# Patient Record
Sex: FEMALE | Race: BLACK OR AFRICAN AMERICAN | NOT HISPANIC OR LATINO | Employment: FULL TIME | URBAN - METROPOLITAN AREA
[De-identification: names, ages, dates, MRNs, and addresses within clinical notes are randomized per-mention and may not be internally consistent; named-entity substitution may affect disease eponyms.]

---

## 2019-03-25 ENCOUNTER — HOSPITAL ENCOUNTER (EMERGENCY)
Facility: HOSPITAL | Age: 34
Discharge: HOME | End: 2019-03-25
Attending: EMERGENCY MEDICINE
Payer: MEDICAID

## 2019-03-25 VITALS
HEART RATE: 70 BPM | WEIGHT: 200 LBS | RESPIRATION RATE: 16 BRPM | BODY MASS INDEX: 33.32 KG/M2 | OXYGEN SATURATION: 100 % | SYSTOLIC BLOOD PRESSURE: 122 MMHG | TEMPERATURE: 98 F | DIASTOLIC BLOOD PRESSURE: 76 MMHG | HEIGHT: 65 IN

## 2019-03-25 DIAGNOSIS — R10.84 GENERALIZED ABDOMINAL PAIN: ICD-10-CM

## 2019-03-25 DIAGNOSIS — L29.9 ITCHING: ICD-10-CM

## 2019-03-25 DIAGNOSIS — R11.2 NON-INTRACTABLE VOMITING WITH NAUSEA, UNSPECIFIED VOMITING TYPE: Primary | ICD-10-CM

## 2019-03-25 LAB
B-HCG UR QL: NEGATIVE
BUN SERPL-MCNC: 10 MG/DL (ref 6–30)
CHLORIDE SERPL-SCNC: 104 MMOL/L (ref 95–110)
CREAT SERPL-MCNC: 0.7 MG/DL (ref 0.5–1.4)
CTP QC/QA: YES
GLUCOSE SERPL-MCNC: 90 MG/DL (ref 70–110)
HCT VFR BLD CALC: 33 %PCV (ref 36–54)
POC IONIZED CALCIUM: 1.14 MMOL/L (ref 1.06–1.42)
POC TCO2 (MEASURED): 25 MMOL/L (ref 23–29)
POTASSIUM BLD-SCNC: 3.9 MMOL/L (ref 3.5–5.1)
SAMPLE: ABNORMAL
SODIUM BLD-SCNC: 142 MMOL/L (ref 136–145)

## 2019-03-25 PROCEDURE — 96361 HYDRATE IV INFUSION ADD-ON: CPT

## 2019-03-25 PROCEDURE — 99284 PR EMERGENCY DEPT VISIT,LEVEL IV: ICD-10-PCS | Mod: ,,, | Performed by: EMERGENCY MEDICINE

## 2019-03-25 PROCEDURE — 63600175 PHARM REV CODE 636 W HCPCS: Performed by: EMERGENCY MEDICINE

## 2019-03-25 PROCEDURE — 25000003 PHARM REV CODE 250: Performed by: EMERGENCY MEDICINE

## 2019-03-25 PROCEDURE — 99284 EMERGENCY DEPT VISIT MOD MDM: CPT | Mod: ,,, | Performed by: EMERGENCY MEDICINE

## 2019-03-25 PROCEDURE — 81025 URINE PREGNANCY TEST: CPT | Performed by: EMERGENCY MEDICINE

## 2019-03-25 PROCEDURE — 99284 EMERGENCY DEPT VISIT MOD MDM: CPT | Mod: 25

## 2019-03-25 PROCEDURE — 96374 THER/PROPH/DIAG INJ IV PUSH: CPT

## 2019-03-25 RX ORDER — ONDANSETRON 2 MG/ML
4 INJECTION INTRAMUSCULAR; INTRAVENOUS
Status: COMPLETED | OUTPATIENT
Start: 2019-03-25 | End: 2019-03-25

## 2019-03-25 RX ORDER — ONDANSETRON 2 MG/ML
INJECTION INTRAMUSCULAR; INTRAVENOUS
Status: DISCONTINUED
Start: 2019-03-25 | End: 2019-03-25 | Stop reason: HOSPADM

## 2019-03-25 RX ORDER — ONDANSETRON 4 MG/1
4 TABLET, ORALLY DISINTEGRATING ORAL EVERY 6 HOURS PRN
Qty: 12 TABLET | Refills: 0 | OUTPATIENT
Start: 2019-03-25 | End: 2021-07-23

## 2019-03-25 RX ADMIN — ALUMINUM HYDROXIDE, MAGNESIUM HYDROXIDE, AND SIMETHICONE 50 ML: 200; 200; 20 SUSPENSION ORAL at 01:03

## 2019-03-25 RX ADMIN — SODIUM CHLORIDE, SODIUM LACTATE, POTASSIUM CHLORIDE, AND CALCIUM CHLORIDE 1000 ML: .6; .31; .03; .02 INJECTION, SOLUTION INTRAVENOUS at 12:03

## 2019-03-25 RX ADMIN — ONDANSETRON 4 MG: 2 INJECTION INTRAMUSCULAR; INTRAVENOUS at 12:03

## 2019-03-25 NOTE — DISCHARGE INSTRUCTIONS
Our goal in the emergency department is to always give you outstanding care and exceptional service. You may receive a survey by mail or e-mail in the next week regarding your experience in our ED. We would greatly appreciate your completing and returning the survey. Your feedback provides us with a way to recognize our staff who give very good care and it helps us learn how to improve when your experience was below our aspiration of excellence.     Take Benadryl as needed for itching.  Use the Zofran as needed for nausea.  He may use Maalox as needed for abdominal pain. Continue to take your Protonix as directed.

## 2019-03-25 NOTE — ED PROVIDER NOTES
"Encounter Date: 3/25/2019    SCRIBE #1 NOTE: I, Humphrey Hoffman, am scribing for, and in the presence of,  Nataliia Beltran MD. I have scribed the following portions of the note - Other sections scribed: HPI, ROS, PE.       History     Chief Complaint   Patient presents with    Abdominal Pain     ate [employer] on friday had mold on bread, nvd     33 year year old female with PMHx of GERD and bronchitis presents to the ED with complaints of abdominal pain. The patient states that she ate at [employer] a few days ago and noticed half way through her meal that the bread was covered in blue/black mold. She states that she immediately vomited because "I have a weak stomach," but later in the day vomited again as she became nauseas. That night when she laid down, she had gradual onset abdominal pain that became worse the next morning. She describes the abdominal pain as feeling like "knots in her stomach." She has had multiple episodes of vomiting each day, has been constantly nauseas, and states her abdominal pain is located all over her stomach. She was the only one who ate the moldy food. She is also complaining of a heavier period than usual with many clots, which is unusual for her. She also endorses generalized body itching, light-headedness, fatigue, and chills. She is a mother of 3. She denies diarrhea,     The history is provided by the patient.     Review of patient's allergies indicates:   Allergen Reactions    Pcn [penicillins] Hives and Swelling     Past Medical History:   Diagnosis Date    GERD (gastroesophageal reflux disease)     History of bronchitis     as a child     History of pre-eclampsia in prior pregnancy, currently pregnant     history with previous two     Hypertension     high bp off and on with this pregnancy    Rectal bleeding      Past Surgical History:   Procedure Laterality Date     SECTION      x2    DELIVERY-CEASAREAN SECTION Bilateral 2014    Performed by Renata" [facility] Al[facility]    TUBAL LIGATION       Family History   Problem Relation Age of Onset    Asthma Mother     Hypertension Mother     Arthritis Father     Diabetes Father     Hyperlipidemia Father     Hypertension Father     Cancer Maternal Grandfather     Diabetes Maternal Grandfather     Heart disease Maternal Grandfather     Cancer Paternal Grandmother     Diabetes Paternal Grandmother      Social History     Tobacco Use    Smoking status: Current Every Day Smoker     Packs/day: 0.20     Types: Cigars    Smokeless tobacco: Former User     Quit date: 10/30/2013   Substance Use Topics    Alcohol use: Yes     Alcohol/week: 0.0 oz    Drug use: No     Review of Systems   Constitutional: Positive for appetite change (less), chills and fatigue. Negative for fever.   HENT: Positive for sore throat (from vomiting). Negative for rhinorrhea.    Respiratory: Negative for cough and shortness of breath.    Cardiovascular: Negative for chest pain and leg swelling.   Gastrointestinal: Positive for abdominal pain, nausea and vomiting. Negative for blood in stool and diarrhea.   Genitourinary: Positive for menstrual problem (heavier than normal). Negative for dysuria, hematuria and urgency.   Musculoskeletal: Negative for arthralgias and myalgias.   Skin: Negative for pallor and rash.   Neurological: Positive for weakness and light-headedness. Negative for syncope.   Hematological: Negative for adenopathy. Does not bruise/bleed easily.       Physical Exam     Initial Vitals [03/25/19 0913]   BP Pulse Resp Temp SpO2   (!) 159/90 85 18 98.1 °F (36.7 °C) 100 %      MAP       --         Physical Exam    Nursing note and vitals reviewed.  Constitutional: She appears well-developed and well-nourished. No distress.   HENT:   Head: Normocephalic and atraumatic.   Right Ear: External ear normal.   Left Ear: External ear normal.   Nose: Nose normal.   Mouth/Throat: Oropharynx is clear and moist.   Eyes: EOM  are normal. Pupils are equal, round, and reactive to light.   Neck: Normal range of motion. Neck supple. No JVD present.   Cardiovascular: Normal rate, regular rhythm and normal heart sounds. Exam reveals no gallop and no friction rub.    No murmur heard.  Pulmonary/Chest: Breath sounds normal. No respiratory distress. She has no wheezes. She has no rhonchi. She has no rales.   Abdominal: Soft. She exhibits no distension.   Mild diffuse tenderness. No rebound or guarding.   Musculoskeletal: Normal range of motion. She exhibits no edema.   Neurological: She is alert and oriented to person, place, and time. She has normal strength. No cranial nerve deficit or sensory deficit.   Skin: Skin is warm and dry. No rash noted.   Psychiatric: Her behavior is normal. Thought content normal.         ED Course   Procedures  Labs Reviewed   ISTAT PROCEDURE - Abnormal; Notable for the following components:       Result Value    POC Hematocrit 33 (*)     All other components within normal limits   POCT URINE PREGNANCY          Imaging Results    None          Medical Decision Making:   History:   Old Medical Records: I decided to obtain old medical records.  Old Records Summarized: other records.       <> Summary of Records: History of GERD.  Has ED visits for unrelated complaints.  Initial Assessment:   33-year-old woman complains of nausea vomiting and abdominal pain for 3 days after eating a [employer] sandwich on which she found multi bread.  She reports her symptoms are only slightly improved today. she can now tolerate ice.  She is concerned about the possibility of an allergic reaction to penicillin given that she visualized mold on the bread.  I feel that this is unlikely given that this would have been a very small amount that would possibly have been destroyed by gastric juices right however she has no rash and mild itching.  I have recommended she use Benadryl p.r.n. itching.  She is driving home.  Will not give a dose  here.  I will give her Zofran and IV fluids and check an i-STAT Chem 8 here.  Will discharge with Zofran as well.  Her abdominal exam is benign.  I do not feel any imaging is needed today.  Clinical Tests:   Lab Tests: Ordered and Reviewed            Scribe Attestation:   Scribe #1: I performed the above scribed service and the documentation accurately describes the services I performed. I attest to the accuracy of the note.    Attending Attestation:           Physician Attestation for Scribe:      Comments: I, Dr. Nataliia Beltran, personally performed the services described in this documentation. All medical record entries made by the scribe were at my direction and in my presence.  I have reviewed the chart and agree that the record reflects my personal performance and is accurate and complete. Nataliia Beltran MD.      Attending ED Notes:   2:30 PM - Patient reports she is feeling somewhat better after IV fluids and GI cocktail. I suspect that her abdominal pain is somewhat due to aggravation of her GERD. Patient advised me at discharge that she is no longer taking her Protonix. I recommended she start taking Zantac 150 mg QHS and that she should follow up with her PCP in 2 weeks for recheck. She was also given a prescription for Zofran PRN nausea and instructed on advancing diet slowly.              Clinical Impression:       ICD-10-CM ICD-9-CM   1. Non-intractable vomiting with nausea, unspecified vomiting type R11.2 787.01   2. Itching L29.9 698.9   3. Generalized abdominal pain R10.84 789.07                                Nataliia Beltran MD  03/28/19 2209

## 2019-03-25 NOTE — ED NOTES
"Chief Complaint   Patient presents with    Abdominal Pain     ate [employer] on friday had mold on bread, nvd     Pt presents to ED for NVD following eating a "moldy fish filet on Friday." Pt states she noticed the mold on the sandwich after eating half of the sandwich. Pt endorses "itching all over. I'm allergic to penicillin and I know there is penicillin in mold." Pt denies SOB/CP.    Adult physical assessment:    Neuro: Patient AAOx4.   HEENT: PERRLA, no purulent drainage to nares, throat, eyes.  Cardiac: No abnormalities noted. No C/O CP at this time.  Respiratory: Airway is open and patent. No C/O SOB at this time.  Gastro: Bowel sounds present and normoactive x4 quadrants. Abdomen is soft, non-tender, and non-distended.  Musculoskeletal: Patient moving all extremities spontaneously. No obvious swelling or deformity visible.    Active Ambulatory Problems     Diagnosis Date Noted    GERD (gastroesophageal reflux disease) 2013    History of pre-eclampsia in prior pregnancy, currently pregnant 2013    H/O  section 2013    Trichomonas - treated with negative OLGA 2013    Dehydration 2013    Supervision of other normal pregnancy 2013    Abdominal pain in pregnancy 2014    Pregnancy with one fetus 2014    wants btl 2014     Resolved Ambulatory Problems     Diagnosis Date Noted    No Resolved Ambulatory Problems     Past Medical History:   Diagnosis Date    GERD (gastroesophageal reflux disease)     History of bronchitis     History of pre-eclampsia in prior pregnancy, currently pregnant     Hypertension     Rectal bleeding      Review of patient's allergies indicates:   Allergen Reactions    Pcn [penicillins] Hives and Swelling       "

## 2019-09-12 ENCOUNTER — OFFICE VISIT (OUTPATIENT)
Dept: URGENT CARE | Facility: CLINIC | Age: 34
End: 2019-09-12
Payer: MEDICAID

## 2019-09-12 VITALS
TEMPERATURE: 99 F | WEIGHT: 220 LBS | BODY MASS INDEX: 37.18 KG/M2 | SYSTOLIC BLOOD PRESSURE: 123 MMHG | HEART RATE: 85 BPM | OXYGEN SATURATION: 98 % | DIASTOLIC BLOOD PRESSURE: 82 MMHG

## 2019-09-12 DIAGNOSIS — S53.401A SPRAIN OF RIGHT ELBOW, INITIAL ENCOUNTER: Primary | ICD-10-CM

## 2019-09-12 PROCEDURE — 99214 PR OFFICE/OUTPT VISIT, EST, LEVL IV, 30-39 MIN: ICD-10-PCS | Mod: S$GLB,,, | Performed by: NURSE PRACTITIONER

## 2019-09-12 PROCEDURE — 73080 XR ELBOW COMPLETE 3 VIEW RIGHT: ICD-10-PCS | Mod: RT,S$GLB,, | Performed by: RADIOLOGY

## 2019-09-12 PROCEDURE — 99214 OFFICE O/P EST MOD 30 MIN: CPT | Mod: S$GLB,,, | Performed by: NURSE PRACTITIONER

## 2019-09-12 PROCEDURE — 73080 X-RAY EXAM OF ELBOW: CPT | Mod: RT,S$GLB,, | Performed by: RADIOLOGY

## 2019-09-12 RX ORDER — CYCLOBENZAPRINE HCL 10 MG
10 TABLET ORAL 3 TIMES DAILY PRN
COMMUNITY

## 2019-09-12 RX ORDER — TRAMADOL HYDROCHLORIDE 50 MG/1
50 TABLET ORAL EVERY 6 HOURS
COMMUNITY

## 2019-09-12 NOTE — PATIENT INSTRUCTIONS
May take Ibuprofen or Tylenol as needed for pain.     If you were prescribed a narcotic or controlled medication, do not drive or operate heavy equipment or machinery while taking these medications.  You must understand that you've received an Urgent Care treatment only and that you may be released before all your medical problems are known or treated. You, the patient, will arrange for follow up care as instructed.  Follow up with your PCP or specialty clinic as directed within 2-5 days if not improved or as needed.  You can call [phone] to schedule an appointment with the appropriate provider.  If your condition worsens we recommend that you receive another evaluation at the emergency room immediately or contact your primary medical clinics after hours call service to discuss your concerns.  Please return here or go to the Emergency Department for any concerns or worsening of condition.      Elbow Sprain    A sprain is a tearing of the ligaments that hold a joint together. This may take up to 6 weeks to fully heal, depending on how severe it is. Moderate to severe sprains are treated with a sling or splint. Minor sprains can be treated without any special support.  Home care  The following guidelines will help you care for your injury at home:  · Keep your arm elevated to reduce pain and swelling. When sitting or lying down keep your arm above the level of your heart. You can do this by placing your arm on a pillow that rests on your chest or on a pillow at your side. This is most important during the first 2 days (48 hours) after injury.  · Put an ice pack on the injured area. Do this for 20 minutes every 1 to 2 hours the first day. You can make an ice pack by wrapping a plastic bag of ice cubes in a thin towel. As the ice melts, be careful that the splint doesnt get wet. Continue using the ice pack 3 to 4 times a day for the next 2 days. Then use the ice pack as needed to ease pain and swelling.  · If  you were given a plaster or fiberglass splint, leave it on as advised, or until you see your healthcare provider. Keep it dry at all times. Bathe with your splint out of the water. Protect it with a large plastic bag, rubber-banded at the top end. If a fiberglass splint gets wet, you can dry it with a hair dryer. Once the splint is removed, move your elbow through its full range of motion several times a day. This will prevent stiffness.  · If you were given a sling only, begin gradual range-of-motion exercises after the first few days, unless told otherwise. This will prevent stiffness in the elbow. Stop wearing the sling once the pain is better.  · You may use acetaminophen or ibuprofen to control pain, unless another pain medicine was prescribed. If you have chronic liver or kidney disease, talk with your healthcare provider before using these medicines. Also talk with your provider if youve had a stomach ulcer or gastrointestinal bleeding.  Follow-up care  Follow up with your doctor as directed.  Any X-rays you had today dont show any broken bones, breaks, or fractures. Sometimes fractures dont show up on the first X-ray. Bruises and sprains can sometimes hurt as much as a fracture. These injuries can take time to heal completely. If your symptoms dont improve or they get worse, talk with your healthcare provider. You may need a repeat X-ray or other tests.  When to seek medical advice  Call your healthcare provider right away  if any of these occur:  · The plaster splint becomes wet or soft  · The fiberglass splint remains wet for more than 24 hours  · Bad odor from the splint or wound fluid stains the splint  · Splint cracks  · Tightness or pain in the elbow gets worse  · Fingers become swollen, cold, blue, numb, or tingly  · You are less able to move the elbow, hand or fingers  · Area around splint becomes red, swollen, or irritated  · Fever of 101ºF (38.3ºC) or higher, or as directed by your healthcare  provider  Date Last Reviewed: 5/1/2017  © 1636-2281 [employer]. [address] [place] [address], [place] [address]. All rights reserved. This information is not intended as a substitute for professional medical care. Always follow your healthcare professional's instructions.

## 2019-09-12 NOTE — PROGRESS NOTES
Subjective:       Patient ID: Talia Colmenares is a 33 y.o. female.    Vitals:  weight is 99.8 kg (220 lb). Her temperature is 98.7 °F (37.1 °C). Her blood pressure is 123/82 and her pulse is 85. Her oxygen saturation is 98%.     Chief Complaint: Elbow Injury    Pt states that she is having right elbow pain that started 9/11/19. Patient was playing with son and hurt her right elbow. Painful to extend.     Elbow Injury   This is a new problem. The current episode started yesterday. The problem occurs constantly. The problem has been unchanged. Pertinent negatives include no abdominal pain, fatigue, joint swelling, vertigo or weakness. The symptoms are aggravated by bending. She has tried NSAIDs for the symptoms. The treatment provided mild relief.       Constitution: Negative for fatigue.   HENT: Negative for facial swelling and facial trauma.    Neck: Negative for neck stiffness.   Cardiovascular: Negative for chest trauma.   Eyes: Negative for eye trauma, double vision and blurred vision.   Gastrointestinal: Negative for abdominal trauma, abdominal pain and rectal bleeding.   Genitourinary: Negative for hematuria, missed menses, genital trauma and pelvic pain.   Musculoskeletal: Positive for pain. Negative for trauma, joint swelling and abnormal ROM of joint.   Skin: Negative for color change, wound, abrasion, laceration and bruising.   Neurological: Negative for dizziness, history of vertigo, light-headedness, coordination disturbances, altered mental status and loss of consciousness.   Hematologic/Lymphatic: Negative for history of bleeding disorder.   Psychiatric/Behavioral: Negative for altered mental status.       Objective:      Physical Exam   Constitutional: She is oriented to person, place, and time. Vital signs are normal. She appears well-developed and well-nourished. She is active and cooperative.  Non-toxic appearance. She does not have a sickly appearance. She does not appear ill. No distress.   HENT:    Head: Normocephalic and atraumatic.   Nose: Nose normal.   Mouth/Throat: Oropharynx is clear and moist and mucous membranes are normal.   Eyes: Conjunctivae and lids are normal.   Neck: Trachea normal, normal range of motion, full passive range of motion without pain and phonation normal. Neck supple.   Cardiovascular: Normal rate, regular rhythm, normal heart sounds, intact distal pulses and normal pulses.   Pulses:       Radial pulses are 2+ on the right side, and 2+ on the left side.   Pulmonary/Chest: Effort normal and breath sounds normal.   Musculoskeletal: She exhibits no edema or deformity.        Right elbow: She exhibits decreased range of motion (secondary to pain). She exhibits no swelling, no effusion, no deformity and no laceration. Tenderness (mild) found. Medial epicondyle tenderness noted. No radial head, no lateral epicondyle and no olecranon process tenderness noted.   Neurological: She is alert and oriented to person, place, and time. She has normal strength and normal reflexes. No sensory deficit.   Skin: Skin is warm, dry and intact. Capillary refill takes less than 2 seconds. No rash noted. She is not diaphoretic.   Psychiatric: She has a normal mood and affect. Her speech is normal and behavior is normal. Judgment and thought content normal. Cognition and memory are normal.   Nursing note and vitals reviewed.      Assessment:       1. Sprain of right elbow, initial encounter        No fracture or dislocation.  No fat pad elevation.  Cartilage spaces are maintained.  Soft tissues are unremarkable.      Impression       Unremarkable examination      Electronically signed by: Georgi Rodriguez MD       Plan:       Elbow x ray noted to be Negative. Sling applied.     Sprain of right elbow, initial encounter  -     XR ELBOW COMPLETE 3 VIEW RIGHT; Future; Expected date: 09/12/2019  -     SLING FOR HOME USE      Patient Instructions   May take Ibuprofen or Tylenol as needed for pain.     If you were  prescribed a narcotic or controlled medication, do not drive or operate heavy equipment or machinery while taking these medications.  You must understand that you've received an Urgent Care treatment only and that you may be released before all your medical problems are known or treated. You, the patient, will arrange for follow up care as instructed.  Follow up with your PCP or specialty clinic as directed within 2-5 days if not improved or as needed.  You can call [phone] to schedule an appointment with the appropriate provider.  If your condition worsens we recommend that you receive another evaluation at the emergency room immediately or contact your primary medical clinics after hours call service to discuss your concerns.  Please return here or go to the Emergency Department for any concerns or worsening of condition.      Elbow Sprain    A sprain is a tearing of the ligaments that hold a joint together. This may take up to 6 weeks to fully heal, depending on how severe it is. Moderate to severe sprains are treated with a sling or splint. Minor sprains can be treated without any special support.  Home care  The following guidelines will help you care for your injury at home:  · Keep your arm elevated to reduce pain and swelling. When sitting or lying down keep your arm above the level of your heart. You can do this by placing your arm on a pillow that rests on your chest or on a pillow at your side. This is most important during the first 2 days (48 hours) after injury.  · Put an ice pack on the injured area. Do this for 20 minutes every 1 to 2 hours the first day. You can make an ice pack by wrapping a plastic bag of ice cubes in a thin towel. As the ice melts, be careful that the splint doesnt get wet. Continue using the ice pack 3 to 4 times a day for the next 2 days. Then use the ice pack as needed to ease pain and swelling.  · If you were given a plaster or fiberglass splint, leave it on as  advised, or until you see your healthcare provider. Keep it dry at all times. Bathe with your splint out of the water. Protect it with a large plastic bag, rubber-banded at the top end. If a fiberglass splint gets wet, you can dry it with a hair dryer. Once the splint is removed, move your elbow through its full range of motion several times a day. This will prevent stiffness.  · If you were given a sling only, begin gradual range-of-motion exercises after the first few days, unless told otherwise. This will prevent stiffness in the elbow. Stop wearing the sling once the pain is better.  · You may use acetaminophen or ibuprofen to control pain, unless another pain medicine was prescribed. If you have chronic liver or kidney disease, talk with your healthcare provider before using these medicines. Also talk with your provider if youve had a stomach ulcer or gastrointestinal bleeding.  Follow-up care  Follow up with your doctor as directed.  Any X-rays you had today dont show any broken bones, breaks, or fractures. Sometimes fractures dont show up on the first X-ray. Bruises and sprains can sometimes hurt as much as a fracture. These injuries can take time to heal completely. If your symptoms dont improve or they get worse, talk with your healthcare provider. You may need a repeat X-ray or other tests.  When to seek medical advice  Call your healthcare provider right away  if any of these occur:  · The plaster splint becomes wet or soft  · The fiberglass splint remains wet for more than 24 hours  · Bad odor from the splint or wound fluid stains the splint  · Splint cracks  · Tightness or pain in the elbow gets worse  · Fingers become swollen, cold, blue, numb, or tingly  · You are less able to move the elbow, hand or fingers  · Area around splint becomes red, swollen, or irritated  · Fever of 101ºF (38.3ºC) or higher, or as directed by your healthcare provider  Date Last Reviewed: 5/1/2017  © 3431-8659 [employer]. [address] [place] [address], [place] [address]. All rights reserved. This information is not intended as a substitute for professional medical care. Always follow your healthcare professional's instructions.

## 2020-07-12 ENCOUNTER — OFFICE VISIT (OUTPATIENT)
Dept: URGENT CARE | Facility: CLINIC | Age: 35
End: 2020-07-12
Payer: MEDICAID

## 2020-07-12 VITALS
HEIGHT: 65 IN | DIASTOLIC BLOOD PRESSURE: 97 MMHG | OXYGEN SATURATION: 98 % | SYSTOLIC BLOOD PRESSURE: 138 MMHG | HEART RATE: 105 BPM | TEMPERATURE: 97 F | BODY MASS INDEX: 36.65 KG/M2 | WEIGHT: 220 LBS

## 2020-07-12 DIAGNOSIS — H60.392 OTHER INFECTIVE ACUTE OTITIS EXTERNA OF LEFT EAR: Primary | ICD-10-CM

## 2020-07-12 DIAGNOSIS — R59.1 LYMPHADENOPATHY: ICD-10-CM

## 2020-07-12 PROCEDURE — 99214 OFFICE O/P EST MOD 30 MIN: CPT | Mod: S$GLB,,, | Performed by: PHYSICIAN ASSISTANT

## 2020-07-12 PROCEDURE — 99214 PR OFFICE/OUTPT VISIT, EST, LEVL IV, 30-39 MIN: ICD-10-PCS | Mod: S$GLB,,, | Performed by: PHYSICIAN ASSISTANT

## 2020-07-12 RX ORDER — CIPROFLOXACIN AND DEXAMETHASONE 3; 1 MG/ML; MG/ML
4 SUSPENSION/ DROPS AURICULAR (OTIC) 2 TIMES DAILY
Qty: 7.5 ML | Refills: 0 | Status: SHIPPED | OUTPATIENT
Start: 2020-07-12 | End: 2020-07-19

## 2020-07-12 RX ORDER — DEXAMETHASONE SODIUM PHOSPHATE 100 MG/10ML
10 INJECTION INTRAMUSCULAR; INTRAVENOUS
Status: COMPLETED | OUTPATIENT
Start: 2020-07-12 | End: 2020-07-12

## 2020-07-12 RX ADMIN — DEXAMETHASONE SODIUM PHOSPHATE 10 MG: 100 INJECTION INTRAMUSCULAR; INTRAVENOUS at 01:07

## 2020-07-12 NOTE — PATIENT INSTRUCTIONS
General Discharge Instructions     You received a steroid shot today - this can elevate your blood pressure, elevate your blood sugar, water weight gain, nervous energy, redness to the face and dimpling of the skin where the shot goes in.     If you were prescribed a narcotic or controlled medication, do not drive or operate heavy equipment or machinery while taking these medications.  If you were prescribed antibiotics, please take them to completion.  You must understand that you've received an Urgent Care treatment only and that you may be released before all your medical problems are known or treated. You, the patient, will arrange for follow up care as instructed.  Follow up with your PCP or specialty clinic as directed in the next 1-2 weeks if not improved or as needed.  You can call [phone] to schedule an appointment with the appropriate provider.  If your condition worsens we recommend that you receive another evaluation at the emergency room immediately or contact your primary medical clinics after hours call service to discuss your concerns.  Please return here or go to the Emergency Department for any concerns or worsening of condition.      External Ear Infection (Adult)    External otitis (also called swimmers ear) is an infection in the ear canal. It is often caused by bacteria or fungus. It can occur a few days after water gets trapped in the ear canal (from swimming or bathing). It can also occur after cleaning too deeply in the ear canal with a cotton swab or other object. Sometimes, hair care products get into the ear canal and cause this problem.  Symptoms can include pain, fever, itching, redness, drainage, or swelling of the ear canal. Temporary hearing loss may also occur.  Home care  · Do not try to clean the ear canal. This can push pus and bacteria deeper into the canal.  · Use prescribed ear drops as directed. These help reduce swelling and fight the infection. If an ear wick was  placed in the ear canal, apply drops right onto the end of the wick. The wick will draw the medication into the ear canal even if it is swollen closed.  · A cotton ball may be loosely placed in the outer ear to absorb any drainage.  · You may use acetaminophen or ibuprofen to control pain, unless another medication was prescribed. Note: If you have chronic liver or kidney disease or ever had a stomach ulcer or GI bleeding, talk to your health care provider before taking any of these medications.  · Do not allow water to get into your ear when bathing. Also, avoid swimming until the infection has cleared.  Prevention  · Keep your ears dry. This helps lower the risk of infection. Dry your ears with a towel or hair dryer after getting wet. Also, use ear plugs when swimming.  · Do not stick any objects in the ear to remove wax.  · If you feel water trapped in your ear, use ear drops right away. You can get these drops over the counter at most drugstores. They work by removing water from the ear canal.  Follow-up care  Follow up with your health care provider in one week, or as advised.  When to seek medical advice  Call your health care provider right away if any of these occur:  · Ear pain becomes worse or doesnt improve after 3 days of treatment  · Redness or swelling of the outer ear occurs or gets worse  · Headache  · Painful or stiff neck  · Drowsiness or confusion  · Fever of 100.4ºF (38ºC) or higher, or as directed by your health care provider  · Seizure  Date Last Reviewed: 3/22/2015  © 8578-4023 [employer]. [address] [place] [address], [place] [address]. All rights reserved. This information is not intended as a substitute for professional medical care. Always follow your healthcare professional's instructions.      Lymphadenopathy  Lymphadenopathy is swelling of the lymph nodes. Lymph nodes are small, bean-shaped glands around the body.  What are lymph nodes?  Lymph nodes are part of your immune  system. The glands are found in your neck, armpits, groin, chest, and abdomen. They act as filters for lymph fluid as it flows through your body. Lymph fluid contains white blood cells and other things that fight infection.  Why lymph nodes swell  Lymphadenopathy is very common. The glands often enlarge during a viral or bacterial infection. It can happen during a cold, the flu, or strep throat. The nodes may swell in just one area of the body, such as the neck (localized). Or nodes may swell all over the body (generalized). The neck (cervical) lymph nodes are the most common site of lymphadenopathy.  What causes lymphadenopathy?  Dead cells and fluid build up in the lymph nodes as they help fight infection or disease. This causes them to swell in size. Enlarged lymph nodes are often near the source of infection. This can help to find the cause of an infection. For example, swollen lymph nodes around the jaw may be because of an infection in the teeth or mouth. But lymphadenopathy may also be generalized. This is common in some viral illnesses such as mononucleosis or chickenpox (varicella).  Lymphadenopathy can also be caused by:  · Infection of a lymph node or small group of nodes (lymphadenitis)  · Cancer  · Reactions to medicines such as antibiotics and some seizure medicines  · Other health conditions, such as lupus  Symptoms of lymphadenopathy  Lymphadenopathy can cause symptoms such as:  · Lumps under the jaw, on the sides or back of the neck, in the armpits, in the groin, or in the chest or belly (abdomen)  · Pain or tenderness in any of these areas  · Redness or warmth in any of these areas  You may also have symptoms from an infection causing the swollen glands. These symptoms may include fever, sore throat, body aches, or cough.  Diagnosing lymphadenopathy  Your health care provider will ask about your health history and symptoms. He or she will give you a physical exam and check the areas where lymph  nodes are enlarged. Your health care provider will check the size and location of the nodes, and ask how long they have been swollen and if they are painful. Diagnostic tests and referral to specialists may be recommended. They may include:  · Blood tests. These are done to check for signs of infection and other problems.  · Urine test. This is also done to check for infection and other problems.  · Chest X-ray. This test can show enlarged lymph nodes or other problems.  · Lymph node biopsy. If lymph nodes are swollen for 3 to 4 weeks, they may be checked with a biopsy. Small samples of lymph node tissue are taken and checked in a lab for signs of cancer. You may be referred to a specialist in blood disorders and cancer (hematologist and oncologist).  Treatment for lymphadenopathy  The treatment of enlarged lymph nodes depends on the cause. Enlarged lymph nodes are often harmless and go away without any treatment. Treatment is most often done on the cause of the enlarged nodes and may include:  · Antibiotic medicine to treat a bacterial infection  · Incision and drainage (I & D) of a lymph node for lymphadenitis  · Other medicines or procedures to treat the cause of the enlarged nodes  You may need follow-up exam in 3 to 4 weeks to recheck enlarged nodes.     When to call your health care provider  Call your health care provider if you have lymph nodes that are still swollen after 3 to 4 weeks.   Date Last Reviewed: 6/19/2015  © 1401-7953 [employer]. [address] [place] [address]. All rights reserved. This information is not intended as a substitute for professional medical care. Always follow your healthcare professional's instructions.

## 2020-07-12 NOTE — PROGRESS NOTES
"Subjective:       Patient ID: Talia Colmenares is a 34 y.o. female.    Vitals:  height is 5' 5" (1.651 m) and weight is 99.8 kg (220 lb). Her temperature is 97.2 °F (36.2 °C). Her blood pressure is 138/97 (abnormal) and her pulse is 105. Her oxygen saturation is 98%.     Chief Complaint: Otalgia    Pt thinks something (bug) may be in her ear since Friday afternoon. States that she was sitting outside waiting for her aunt to pick her up after getting her pre-procedure COVID swab done (scheduled partial hysterectomy tomorrow). She is unsure if something flew into her ear but states that it's been giving her issues since then. Reports that she does clean her ears daily with Qtips with alcohol. Reports that her ear feels hot and red. This morning, she woke up with left-sided neck swelling.    Otalgia   There is pain in the right ear. This is a new problem. The current episode started in the past 7 days. The problem occurs constantly. There has been no fever. The pain is at a severity of 7/10. The pain is moderate. Associated symptoms include ear discharge. Pertinent negatives include no coughing, diarrhea, headaches, rash, sore throat or vomiting. She has tried nothing for the symptoms.       Constitution: Negative for chills, fatigue and fever.   HENT: Positive for ear pain, ear discharge and foreign body in ear. Negative for congestion and sore throat.    Neck: Negative for painful lymph nodes.   Cardiovascular: Negative for chest pain and leg swelling.   Eyes: Negative for double vision and blurred vision.   Respiratory: Negative for cough and shortness of breath.    Gastrointestinal: Negative for nausea, vomiting and diarrhea.   Genitourinary: Negative for dysuria, frequency, urgency and history of kidney stones.   Musculoskeletal: Negative for joint pain, joint swelling, muscle cramps and muscle ache.   Skin: Negative for color change, pale, rash and bruising.   Allergic/Immunologic: Negative for seasonal " allergies.   Neurological: Negative for dizziness, history of vertigo, light-headedness, passing out and headaches.   Hematologic/Lymphatic: Negative for swollen lymph nodes.   Psychiatric/Behavioral: Negative for nervous/anxious, sleep disturbance and depression. The patient is not nervous/anxious.        Objective:      Physical Exam   Constitutional: She is oriented to person, place, and time. She appears well-developed. She is cooperative.  Non-toxic appearance. She does not appear ill. No distress.   HENT:   Head: Normocephalic and atraumatic.   Right Ear: Hearing, tympanic membrane, external ear and ear canal normal.   Left Ear: Hearing, tympanic membrane, external ear and ear canal normal. There is swelling and tenderness. No foreign bodies. Tympanic membrane is not erythematous.  No middle ear effusion.   Nose: Nose normal. No mucosal edema, rhinorrhea or nasal deformity. No epistaxis. Right sinus exhibits no maxillary sinus tenderness and no frontal sinus tenderness. Left sinus exhibits no maxillary sinus tenderness and no frontal sinus tenderness.   Mouth/Throat: Uvula is midline, oropharynx is clear and moist and mucous membranes are normal. No trismus in the jaw. Normal dentition. No uvula swelling. No posterior oropharyngeal erythema.   Eyes: Conjunctivae and lids are normal. Right eye exhibits no discharge. Left eye exhibits no discharge. No scleral icterus.   Neck: Trachea normal, normal range of motion, full passive range of motion without pain and phonation normal. Neck supple.   Cardiovascular: Normal rate, regular rhythm, normal heart sounds and normal pulses.   Pulmonary/Chest: Effort normal and breath sounds normal. No respiratory distress.   Abdominal: Soft. Normal appearance and bowel sounds are normal. She exhibits no distension, no pulsatile midline mass and no mass. There is no abdominal tenderness.   Musculoskeletal: Normal range of motion.         General: No deformity.   Lymphadenopathy:         Head (right side): No submental, no submandibular, no tonsillar, no preauricular and no posterior auricular adenopathy present.        Head (left side): Preauricular and posterior auricular adenopathy present. No submental, no submandibular and no tonsillar adenopathy present.     She has cervical adenopathy.        Right cervical: No superficial cervical adenopathy present.       Left cervical: Superficial cervical adenopathy present.   Neurological: She is alert and oriented to person, place, and time. She exhibits normal muscle tone. Coordination normal.   Skin: Skin is warm, dry, intact, not diaphoretic and not pale.   Psychiatric: Her speech is normal and behavior is normal. Judgment and thought content normal.   Nursing note and vitals reviewed.        Assessment:       1. Other infective acute otitis externa of left ear    2. Lymphadenopathy        Plan:         Other infective acute otitis externa of left ear  -     ciprofloxacin-dexamethasone 0.3-0.1% (CIPRODEX) 0.3-0.1 % DrpS; Place 4 drops into the left ear 2 (two) times daily. TO AFFECTED EAR for 7 days  Dispense: 7.5 mL; Refill: 0    Lymphadenopathy  -     dexamethasone injection 10 mg         Patient Instructions     General Discharge Instructions     You received a steroid shot today - this can elevate your blood pressure, elevate your blood sugar, water weight gain, nervous energy, redness to the face and dimpling of the skin where the shot goes in.     If you were prescribed a narcotic or controlled medication, do not drive or operate heavy equipment or machinery while taking these medications.  If you were prescribed antibiotics, please take them to completion.  You must understand that you've received an Urgent Care treatment only and that you may be released before all your medical problems are known or treated. You, the patient, will arrange for follow up care as instructed.  Follow up with your PCP or specialty clinic as directed in the  next 1-2 weeks if not improved or as needed.  You can call [phone] to schedule an appointment with the appropriate provider.  If your condition worsens we recommend that you receive another evaluation at the emergency room immediately or contact your primary medical clinics after hours call service to discuss your concerns.  Please return here or go to the Emergency Department for any concerns or worsening of condition.      External Ear Infection (Adult)    External otitis (also called swimmers ear) is an infection in the ear canal. It is often caused by bacteria or fungus. It can occur a few days after water gets trapped in the ear canal (from swimming or bathing). It can also occur after cleaning too deeply in the ear canal with a cotton swab or other object. Sometimes, hair care products get into the ear canal and cause this problem.  Symptoms can include pain, fever, itching, redness, drainage, or swelling of the ear canal. Temporary hearing loss may also occur.  Home care  · Do not try to clean the ear canal. This can push pus and bacteria deeper into the canal.  · Use prescribed ear drops as directed. These help reduce swelling and fight the infection. If an ear wick was placed in the ear canal, apply drops right onto the end of the wick. The wick will draw the medication into the ear canal even if it is swollen closed.  · A cotton ball may be loosely placed in the outer ear to absorb any drainage.  · You may use acetaminophen or ibuprofen to control pain, unless another medication was prescribed. Note: If you have chronic liver or kidney disease or ever had a stomach ulcer or GI bleeding, talk to your health care provider before taking any of these medications.  · Do not allow water to get into your ear when bathing. Also, avoid swimming until the infection has cleared.  Prevention  · Keep your ears dry. This helps lower the risk of infection. Dry your ears with a towel or hair dryer after getting  wet. Also, use ear plugs when swimming.  · Do not stick any objects in the ear to remove wax.  · If you feel water trapped in your ear, use ear drops right away. You can get these drops over the counter at most drugstores. They work by removing water from the ear canal.  Follow-up care  Follow up with your health care provider in one week, or as advised.  When to seek medical advice  Call your health care provider right away if any of these occur:  · Ear pain becomes worse or doesnt improve after 3 days of treatment  · Redness or swelling of the outer ear occurs or gets worse  · Headache  · Painful or stiff neck  · Drowsiness or confusion  · Fever of 100.4ºF (38ºC) or higher, or as directed by your health care provider  · Seizure  Date Last Reviewed: 3/22/2015  © 1628-7614 [employer]. [address] [place] [address]. All rights reserved. This information is not intended as a substitute for professional medical care. Always follow your healthcare professional's instructions.      Lymphadenopathy  Lymphadenopathy is swelling of the lymph nodes. Lymph nodes are small, bean-shaped glands around the body.  What are lymph nodes?  Lymph nodes are part of your immune system. The glands are found in your neck, armpits, groin, chest, and abdomen. They act as filters for lymph fluid as it flows through your body. Lymph fluid contains white blood cells and other things that fight infection.  Why lymph nodes swell  Lymphadenopathy is very common. The glands often enlarge during a viral or bacterial infection. It can happen during a cold, the flu, or strep throat. The nodes may swell in just one area of the body, such as the neck (localized). Or nodes may swell all over the body (generalized). The neck (cervical) lymph nodes are the most common site of lymphadenopathy.  What causes lymphadenopathy?  Dead cells and fluid build up in the lymph nodes as they help fight infection or disease. This causes them  to swell in size. Enlarged lymph nodes are often near the source of infection. This can help to find the cause of an infection. For example, swollen lymph nodes around the jaw may be because of an infection in the teeth or mouth. But lymphadenopathy may also be generalized. This is common in some viral illnesses such as mononucleosis or chickenpox (varicella).  Lymphadenopathy can also be caused by:  · Infection of a lymph node or small group of nodes (lymphadenitis)  · Cancer  · Reactions to medicines such as antibiotics and some seizure medicines  · Other health conditions, such as lupus  Symptoms of lymphadenopathy  Lymphadenopathy can cause symptoms such as:  · Lumps under the jaw, on the sides or back of the neck, in the armpits, in the groin, or in the chest or belly (abdomen)  · Pain or tenderness in any of these areas  · Redness or warmth in any of these areas  You may also have symptoms from an infection causing the swollen glands. These symptoms may include fever, sore throat, body aches, or cough.  Diagnosing lymphadenopathy  Your health care provider will ask about your health history and symptoms. He or she will give you a physical exam and check the areas where lymph nodes are enlarged. Your health care provider will check the size and location of the nodes, and ask how long they have been swollen and if they are painful. Diagnostic tests and referral to specialists may be recommended. They may include:  · Blood tests. These are done to check for signs of infection and other problems.  · Urine test. This is also done to check for infection and other problems.  · Chest X-ray. This test can show enlarged lymph nodes or other problems.  · Lymph node biopsy. If lymph nodes are swollen for 3 to 4 weeks, they may be checked with a biopsy. Small samples of lymph node tissue are taken and checked in a lab for signs of cancer. You may be referred to a specialist in blood disorders and cancer (hematologist and  oncologist).  Treatment for lymphadenopathy  The treatment of enlarged lymph nodes depends on the cause. Enlarged lymph nodes are often harmless and go away without any treatment. Treatment is most often done on the cause of the enlarged nodes and may include:  · Antibiotic medicine to treat a bacterial infection  · Incision and drainage (I & D) of a lymph node for lymphadenitis  · Other medicines or procedures to treat the cause of the enlarged nodes  You may need follow-up exam in 3 to 4 weeks to recheck enlarged nodes.     When to call your health care provider  Call your health care provider if you have lymph nodes that are still swollen after 3 to 4 weeks.   Date Last Reviewed: 6/19/2015  © 4383-7611 [employer]. [address] [place] [address], [place] [address]. All rights reserved. This information is not intended as a substitute for professional medical care. Always follow your healthcare professional's instructions.

## 2020-11-13 ENCOUNTER — OFFICE VISIT (OUTPATIENT)
Dept: URGENT CARE | Facility: CLINIC | Age: 35
End: 2020-11-13
Payer: MEDICAID

## 2020-11-13 VITALS
DIASTOLIC BLOOD PRESSURE: 82 MMHG | SYSTOLIC BLOOD PRESSURE: 130 MMHG | HEART RATE: 89 BPM | RESPIRATION RATE: 16 BRPM | OXYGEN SATURATION: 100 % | TEMPERATURE: 97 F

## 2020-11-13 DIAGNOSIS — J03.90 EXUDATIVE TONSILLITIS: Primary | ICD-10-CM

## 2020-11-13 DIAGNOSIS — J02.9 SORE THROAT: ICD-10-CM

## 2020-11-13 LAB
CTP QC/QA: YES
CTP QC/QA: YES
MOLECULAR STREP A: NEGATIVE
SARS-COV-2 RDRP RESP QL NAA+PROBE: NEGATIVE

## 2020-11-13 PROCEDURE — 87651 STREP A DNA AMP PROBE: CPT | Mod: QW,S$GLB,, | Performed by: NURSE PRACTITIONER

## 2020-11-13 PROCEDURE — 87651 POCT STREP A MOLECULAR: ICD-10-PCS | Mod: QW,S$GLB,, | Performed by: NURSE PRACTITIONER

## 2020-11-13 PROCEDURE — 99214 OFFICE O/P EST MOD 30 MIN: CPT | Mod: S$GLB,,, | Performed by: NURSE PRACTITIONER

## 2020-11-13 PROCEDURE — U0002: ICD-10-PCS | Mod: QW,S$GLB,, | Performed by: NURSE PRACTITIONER

## 2020-11-13 PROCEDURE — U0002 COVID-19 LAB TEST NON-CDC: HCPCS | Mod: QW,S$GLB,, | Performed by: NURSE PRACTITIONER

## 2020-11-13 PROCEDURE — 99214 PR OFFICE/OUTPT VISIT, EST, LEVL IV, 30-39 MIN: ICD-10-PCS | Mod: S$GLB,,, | Performed by: NURSE PRACTITIONER

## 2020-11-13 RX ORDER — DEXAMETHASONE SODIUM PHOSPHATE 100 MG/10ML
8 INJECTION INTRAMUSCULAR; INTRAVENOUS
Status: COMPLETED | OUTPATIENT
Start: 2020-11-13 | End: 2020-11-13

## 2020-11-13 RX ORDER — AZITHROMYCIN 250 MG/1
TABLET, FILM COATED ORAL
Qty: 6 TABLET | Refills: 0 | OUTPATIENT
Start: 2020-11-13 | End: 2022-03-06

## 2020-11-13 RX ADMIN — DEXAMETHASONE SODIUM PHOSPHATE 8 MG: 100 INJECTION INTRAMUSCULAR; INTRAVENOUS at 06:11

## 2020-11-13 NOTE — PROGRESS NOTES
Subjective:       Patient ID: Talia Colmenares is a 35 y.o. female.    Vitals:  temperature is 97 °F (36.1 °C). Her blood pressure is 130/82 and her pulse is 89. Her respiration is 16 and oxygen saturation is 100%.     Chief Complaint: Sore Throat    Pt reports swollen tonsils, sore throat, pain with swallowing. States she gest tonsillitis about once a year. No fever, chills, cough, CP, SOB, abd pain, headache. She has not tried to take any medication for relief. She denies any other symptoms. She states she usually gets a steroid shot and antibiotics and that makes her feel better.    Sore Throat   This is a new problem. The current episode started today. The problem has been unchanged. Neither side of throat is experiencing more pain than the other. There has been no fever. Pertinent negatives include no congestion, coughing, diarrhea, headaches, shortness of breath or vomiting.       Constitution: Negative for chills, fatigue and fever.   HENT: Positive for sore throat. Negative for congestion.    Neck: Negative for painful lymph nodes.   Cardiovascular: Negative for chest pain and leg swelling.   Eyes: Negative for double vision and blurred vision.   Respiratory: Negative for cough and shortness of breath.    Gastrointestinal: Negative for nausea, vomiting and diarrhea.   Genitourinary: Negative for dysuria, frequency, urgency and history of kidney stones.   Musculoskeletal: Negative for joint pain, joint swelling, muscle cramps and muscle ache.   Skin: Negative for color change, pale, rash and bruising.   Allergic/Immunologic: Negative for seasonal allergies.   Neurological: Negative for dizziness, history of vertigo, light-headedness, passing out and headaches.   Hematologic/Lymphatic: Negative for swollen lymph nodes.   Psychiatric/Behavioral: Negative for nervous/anxious, sleep disturbance and depression. The patient is not nervous/anxious.        Objective:      Physical Exam   Constitutional: She is  oriented to person, place, and time. She appears well-developed. She is cooperative.  Non-toxic appearance. She does not appear ill. No distress.   HENT:   Head: Normocephalic and atraumatic.   Ears:   Right Ear: Hearing, tympanic membrane, external ear and ear canal normal.   Left Ear: Hearing, tympanic membrane, external ear and ear canal normal.   Nose: Nose normal. No mucosal edema, rhinorrhea or nasal deformity. No epistaxis. Right sinus exhibits no maxillary sinus tenderness and no frontal sinus tenderness. Left sinus exhibits no maxillary sinus tenderness and no frontal sinus tenderness.   Mouth/Throat: Uvula is midline and mucous membranes are normal. No trismus in the jaw. Normal dentition. No uvula swelling. Posterior oropharyngeal erythema present. No oropharyngeal exudate or posterior oropharyngeal edema. Tonsils are 3+ on the right. Tonsils are 3+ on the left. No tonsillar exudate.   Eyes: Pupils are equal, round, and reactive to light. Conjunctivae and lids are normal. No scleral icterus.   Neck: Trachea normal, normal range of motion, full passive range of motion without pain and phonation normal. Neck supple. No neck rigidity. No edema and no erythema present.   Cardiovascular: Normal rate, regular rhythm, normal heart sounds and normal pulses.   Pulmonary/Chest: Effort normal and breath sounds normal. No respiratory distress. She has no decreased breath sounds. She has no wheezes. She has no rhonchi. She has no rales.   Abdominal: Normal appearance.   Musculoskeletal: Normal range of motion.         General: No deformity.   Lymphadenopathy:     She has cervical adenopathy (TTP).   Neurological: She is alert and oriented to person, place, and time. She exhibits normal muscle tone. Coordination normal.   Skin: Skin is warm, dry, intact, not diaphoretic and not pale. Psychiatric: Her speech is normal and behavior is normal. Judgment and thought content normal.   Nursing note and vitals  reviewed.    Results for orders placed or performed in visit on 11/13/20   POCT COVID-19 Rapid Screening   Result Value Ref Range    POC Rapid COVID Negative Negative     Acceptable Yes    POCT Strep A, Molecular   Result Value Ref Range    Molecular Strep A, POC Negative Negative     Acceptable Yes            Assessment:       1. Exudative tonsillitis    2. Sore throat        Plan:         Exudative tonsillitis  -     dexamethasone injection 8 mg  -     azithromycin (Z-RICO) 250 MG tablet; Please take two tablets (500mg total) by mouth on day 1; then one tablet (250mg) by mouth on days 2-5.  Dispense: 6 tablet; Refill: 0    Sore throat  -     POCT COVID-19 Rapid Screening  -     POCT Strep A, Molecular         Reviewed previous pertinent office visits, PMH, PSH, fam hx\  Recommended otc motrin or tylenol as needed for fever/aches unless contraindicated  Advised on return/follow-up precautions. Advised on ER precautions. Answered all patient questions. Patient verbalized understanding and voiced agreement with current treatment plan.    Patient Instructions        If not allergic,take tylenol (acetominophen) for fever control, chills, or body aches every 4 hours. Do not exceed 4000 mg/ day.If not allergic, take Motrin (Ibuprofen) every 4 hours for fever, chills, pain or inflammation. Do not exceed 2400 mg/day. You can alternate taking tylenol and motrin.    You must understand that you've received an Urgent Care treatment only and that you may be released before all your medical problems are known or treated. You, the patient, will arrange for follow up care as instructed.     Follow up with your PCP or specialty clinic as instructed in the next 2-3 days if not improved or as needed. You can call [phone] to schedule an appointment with appropriate provider.     If you condition worsens, we recommend that you receive another evaluation at the emergency room immediately or contact  your primary medical clinic's after hours call service to discuss your concerns.     Please return here or go to the Emergency Department for any concerns or worsening condition.     If you were prescribed a narcotic or controlled substance, do not drive or operate heavy equipment or machinery while taking these medications.     Self-Care for Sore Throats    Sore throats happen for many reasons, such as colds, allergies, and infections caused by viruses or bacteria. In any case, your throat becomes red and sore. Your goal for self-care is to reduce your discomfort while giving your throat a chance to heal.  Moisten and soothe your throat  Tips include the following:  · Try a sip of water first thing after waking up.  · Keep your throat moist by drinking 6 or more glasses of clear liquids every day.  · Run a cool-air humidifier in your room overnight.  · Avoid cigarette smoke.   · Suck on throat lozenges, cough drops, hard candy, ice chips, or frozen fruit-juice bars. Use the sugar-free versions if your diet or medical condition requires them.  Gargle to ease irritation  Gargling every hour or 2 can ease irritation. Try gargling with 1 of these solutions:  · 1/4 teaspoon of salt in 1/2 cup of warm water  · An over-the-counter anesthetic gargle  Use medicine for more relief  Over-the-counter medicine can reduce sore throat symptoms. Ask your pharmacist if you have questions about which medicine to use:  · Ease pain with anesthetic sprays. Aspirin or an aspirin substitute also helps. Remember, never give aspirin to anyone 18 or younger, or if you are already taking blood thinners.   · For sore throats caused by allergies, try antihistamines to block the allergic reaction.  · Remember: unless a sore throat is caused by a bacterial infection, antibiotics wont help you.  Prevent future sore throats  Prevention tips include the following:  · Stop smoking or reduce contact with secondhand smoke. Smoke irritates the tender  throat lining.  · Limit contact with pets and with allergy-causing substances, such as pollen and mold.  · When youre around someone with a sore throat or cold, wash your hands often to keep viruses or bacteria from spreading.  · Dont strain your vocal cords.  Call your healthcare provider  Contact your healthcare provider if you have:  · A temperature over 101°F (38.3°C)  · White spots on the throat  · Great difficulty swallowing  · Trouble breathing  · A skin rash  · Recent exposure to someone else with strep bacteria  · Severe hoarseness and swollen glands in the neck or jaw   Date Last Reviewed: 8/1/2016  © 7989-2883 [employer]. [address] [place] [address], [place] [address]. All rights reserved. This information is not intended as a substitute for professional medical care. Always follow your healthcare professional's instructions.

## 2020-11-13 NOTE — LETTER
November 13, 2020      [facility] [address] [facility], [place] [address]  Phone: [phone]  Fax: [phone]       Patient: Talia Colmenares   YOB: 1985  Date of Visit: 11/13/2020    To Whom It May Concern:    Marito Colmenares  was at [facility] on 11/13/2020. She may return to work/school on November 16th with no restrictions. If you have any questions or concerns, or if I can be of further assistance, please do not hesitate to contact me.    Sincerely,      Coby Tapia, NP

## 2020-11-14 NOTE — PATIENT INSTRUCTIONS
If not allergic,take tylenol (acetominophen) for fever control, chills, or body aches every 4 hours. Do not exceed 4000 mg/ day.If not allergic, take Motrin (Ibuprofen) every 4 hours for fever, chills, pain or inflammation. Do not exceed 2400 mg/day. You can alternate taking tylenol and motrin.    You must understand that you've received an Urgent Care treatment only and that you may be released before all your medical problems are known or treated. You, the patient, will arrange for follow up care as instructed.     Follow up with your PCP or specialty clinic as instructed in the next 2-3 days if not improved or as needed. You can call [phone] to schedule an appointment with appropriate provider.     If you condition worsens, we recommend that you receive another evaluation at the emergency room immediately or contact your primary medical clinic's after hours call service to discuss your concerns.     Please return here or go to the Emergency Department for any concerns or worsening condition.     If you were prescribed a narcotic or controlled substance, do not drive or operate heavy equipment or machinery while taking these medications.     Self-Care for Sore Throats    Sore throats happen for many reasons, such as colds, allergies, and infections caused by viruses or bacteria. In any case, your throat becomes red and sore. Your goal for self-care is to reduce your discomfort while giving your throat a chance to heal.  Moisten and soothe your throat  Tips include the following:  · Try a sip of water first thing after waking up.  · Keep your throat moist by drinking 6 or more glasses of clear liquids every day.  · Run a cool-air humidifier in your room overnight.  · Avoid cigarette smoke.   · Suck on throat lozenges, cough drops, hard candy, ice chips, or frozen fruit-juice bars. Use the sugar-free versions if your diet or medical condition requires them.  Gargle to ease irritation  Gargling every hour  or 2 can ease irritation. Try gargling with 1 of these solutions:  · 1/4 teaspoon of salt in 1/2 cup of warm water  · An over-the-counter anesthetic gargle  Use medicine for more relief  Over-the-counter medicine can reduce sore throat symptoms. Ask your pharmacist if you have questions about which medicine to use:  · Ease pain with anesthetic sprays. Aspirin or an aspirin substitute also helps. Remember, never give aspirin to anyone 18 or younger, or if you are already taking blood thinners.   · For sore throats caused by allergies, try antihistamines to block the allergic reaction.  · Remember: unless a sore throat is caused by a bacterial infection, antibiotics wont help you.  Prevent future sore throats  Prevention tips include the following:  · Stop smoking or reduce contact with secondhand smoke. Smoke irritates the tender throat lining.  · Limit contact with pets and with allergy-causing substances, such as pollen and mold.  · When youre around someone with a sore throat or cold, wash your hands often to keep viruses or bacteria from spreading.  · Dont strain your vocal cords.  Call your healthcare provider  Contact your healthcare provider if you have:  · A temperature over 101°F (38.3°C)  · White spots on the throat  · Great difficulty swallowing  · Trouble breathing  · A skin rash  · Recent exposure to someone else with strep bacteria  · Severe hoarseness and swollen glands in the neck or jaw   Date Last Reviewed: 8/1/2016  © 1250-4645 [employer]. [address] [place] [address], [place] [address]. All rights reserved. This information is not intended as a substitute for professional medical care. Always follow your healthcare professional's instructions.         WOUNDS

## 2021-03-10 ENCOUNTER — CLINICAL SUPPORT (OUTPATIENT)
Dept: URGENT CARE | Facility: CLINIC | Age: 36
End: 2021-03-10
Payer: MEDICAID

## 2021-03-10 DIAGNOSIS — Z11.9 ENCOUNTER FOR SCREENING EXAMINATION FOR INFECTIOUS DISEASE: Primary | ICD-10-CM

## 2021-03-10 LAB
CTP QC/QA: YES
SARS-COV-2 RDRP RESP QL NAA+PROBE: NEGATIVE

## 2021-03-10 PROCEDURE — U0002 COVID-19 LAB TEST NON-CDC: HCPCS | Mod: QW,S$GLB,, | Performed by: PHYSICIAN ASSISTANT

## 2021-03-10 PROCEDURE — U0002: ICD-10-PCS | Mod: QW,S$GLB,, | Performed by: PHYSICIAN ASSISTANT

## 2021-03-31 ENCOUNTER — IMMUNIZATION (OUTPATIENT)
Dept: OBSTETRICS AND GYNECOLOGY | Facility: CLINIC | Age: 36
End: 2021-03-31
Payer: MEDICAID

## 2021-03-31 DIAGNOSIS — Z23 NEED FOR VACCINATION: Primary | ICD-10-CM

## 2021-03-31 PROCEDURE — 91300 COVID-19, MRNA, LNP-S, PF, 30 MCG/0.3 ML DOSE VACCINE: CPT | Mod: PBBFAC | Performed by: FAMILY MEDICINE

## 2021-04-21 ENCOUNTER — IMMUNIZATION (OUTPATIENT)
Dept: OBSTETRICS AND GYNECOLOGY | Facility: CLINIC | Age: 36
End: 2021-04-21
Payer: MEDICAID

## 2021-04-21 DIAGNOSIS — Z23 NEED FOR VACCINATION: Primary | ICD-10-CM

## 2021-04-21 PROCEDURE — 91300 COVID-19, MRNA, LNP-S, PF, 30 MCG/0.3 ML DOSE VACCINE: CPT | Mod: PBBFAC

## 2021-04-21 PROCEDURE — 0002A COVID-19, MRNA, LNP-S, PF, 30 MCG/0.3 ML DOSE VACCINE: CPT | Mod: PBBFAC

## 2021-07-23 ENCOUNTER — HOSPITAL ENCOUNTER (EMERGENCY)
Facility: HOSPITAL | Age: 36
Discharge: HOME | End: 2021-07-23
Attending: EMERGENCY MEDICINE
Payer: MEDICAID

## 2021-07-23 VITALS
OXYGEN SATURATION: 99 % | BODY MASS INDEX: 37.56 KG/M2 | HEIGHT: 64 IN | HEART RATE: 86 BPM | SYSTOLIC BLOOD PRESSURE: 137 MMHG | WEIGHT: 220 LBS | RESPIRATION RATE: 18 BRPM | TEMPERATURE: 99 F | DIASTOLIC BLOOD PRESSURE: 77 MMHG

## 2021-07-23 DIAGNOSIS — Z20.822 ENCOUNTER FOR LABORATORY TESTING FOR COVID-19 VIRUS: ICD-10-CM
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
## 2021-07-23 LAB
B-HCG UR QL: NEGATIVE
CTP QC/QA: YES

## 2021-07-23 PROCEDURE — 99284 EMERGENCY DEPT VISIT MOD MDM: CPT | Mod: 25,ER

## 2021-07-23 PROCEDURE — 63600175 PHARM REV CODE 636 W HCPCS: Mod: ER | Performed by: NURSE PRACTITIONER

## 2021-07-23 PROCEDURE — U0005 INFEC AGEN DETEC AMPLI PROBE: HCPCS | Performed by: EMERGENCY MEDICINE

## 2021-07-23 PROCEDURE — 96372 THER/PROPH/DIAG INJ SC/IM: CPT | Mod: ER

## 2021-07-23 PROCEDURE — 90715 TDAP VACCINE 7 YRS/> IM: CPT | Mod: ER | Performed by: NURSE PRACTITIONER

## 2021-07-23 PROCEDURE — 25000003 PHARM REV CODE 250: Mod: ER | Performed by: NURSE PRACTITIONER

## 2021-07-23 PROCEDURE — U0003 INFECTIOUS AGENT DETECTION BY NUCLEIC ACID (DNA OR RNA); SEVERE ACUTE RESPIRATORY SYNDROME CORONAVIRUS 2 (SARS-COV-2) (CORONAVIRUS DISEASE [COVID-19]), AMPLIFIED PROBE TECHNIQUE, MAKING USE OF HIGH THROUGHPUT TECHNOLOGIES AS DESCRIBED BY CMS-2020-01-R: HCPCS | Performed by: EMERGENCY MEDICINE

## 2021-07-23 PROCEDURE — 90471 IMMUNIZATION ADMIN: CPT | Mod: ER | Performed by: NURSE PRACTITIONER

## 2021-07-23 PROCEDURE — 81025 URINE PREGNANCY TEST: CPT | Mod: ER | Performed by: EMERGENCY MEDICINE

## 2021-07-23 RX ORDER — CLINDAMYCIN PHOSPHATE 150 MG/ML
600 INJECTION, SOLUTION INTRAVENOUS
Status: COMPLETED | OUTPATIENT
Start: 2021-07-23 | End: 2021-07-23

## 2021-07-23 RX ORDER — CLINDAMYCIN HYDROCHLORIDE 150 MG/1
450 CAPSULE ORAL EVERY 8 HOURS
Qty: 90 CAPSULE | Refills: 0 | Status: SHIPPED | OUTPATIENT
Start: 2021-07-23 | End: 2021-08-02

## 2021-07-23 RX ADMIN — TETANUS TOXOID, REDUCED DIPHTHERIA TOXOID AND ACELLULAR PERTUSSIS VACCINE, ADSORBED 0.5 ML: 5; 2.5; 8; 8; 2.5 SUSPENSION INTRAMUSCULAR at 11:07

## 2021-07-23 RX ADMIN — CLINDAMYCIN PHOSPHATE 600 MG: 150 INJECTION, SOLUTION INTRAMUSCULAR; INTRAVENOUS at 11:07

## 2021-07-24 LAB
SARS-COV-2 RNA RESP QL NAA+PROBE: NOT DETECTED
SARS-COV-2- CYCLE NUMBER: -1

## 2021-08-16 ENCOUNTER — OFFICE VISIT (OUTPATIENT)
Dept: URGENT CARE | Facility: CLINIC | Age: 36
End: 2021-08-16
Payer: MEDICAID

## 2021-08-16 VITALS
OXYGEN SATURATION: 98 % | SYSTOLIC BLOOD PRESSURE: 130 MMHG | HEART RATE: 102 BPM | TEMPERATURE: 98 F | DIASTOLIC BLOOD PRESSURE: 84 MMHG | RESPIRATION RATE: 18 BRPM | WEIGHT: 214.94 LBS | BODY MASS INDEX: 36.9 KG/M2

## 2021-08-16 DIAGNOSIS — R05.9 COUGH: Primary | ICD-10-CM

## 2021-08-16 DIAGNOSIS — U07.1 COVID-19 VIRUS DETECTED: ICD-10-CM

## 2021-08-16 LAB
CTP QC/QA: YES
SARS-COV-2 RDRP RESP QL NAA+PROBE: POSITIVE

## 2021-08-16 PROCEDURE — U0002: ICD-10-PCS | Mod: QW,S$GLB,, | Performed by: FAMILY MEDICINE

## 2021-08-16 PROCEDURE — U0002 COVID-19 LAB TEST NON-CDC: HCPCS | Mod: QW,S$GLB,, | Performed by: FAMILY MEDICINE

## 2021-08-16 PROCEDURE — 99212 PR OFFICE/OUTPT VISIT, EST, LEVL II, 10-19 MIN: ICD-10-PCS | Mod: S$GLB,,, | Performed by: FAMILY MEDICINE

## 2021-08-16 PROCEDURE — 99212 OFFICE O/P EST SF 10 MIN: CPT | Mod: S$GLB,,, | Performed by: FAMILY MEDICINE

## 2021-08-16 RX ORDER — AZITHROMYCIN 250 MG/1
TABLET, FILM COATED ORAL
Qty: 6 TABLET | Refills: 0 | OUTPATIENT
Start: 2021-08-16 | End: 2022-03-06

## 2021-08-17 ENCOUNTER — NURSE TRIAGE (OUTPATIENT)
Dept: ADMINISTRATIVE | Facility: CLINIC | Age: 36
End: 2021-08-17

## 2021-08-18 ENCOUNTER — INFUSION (OUTPATIENT)
Dept: INFECTIOUS DISEASES | Facility: HOSPITAL | Age: 36
End: 2021-08-18
Attending: INTERNAL MEDICINE
Payer: MEDICAID

## 2021-08-18 VITALS
SYSTOLIC BLOOD PRESSURE: 110 MMHG | WEIGHT: 219 LBS | HEART RATE: 63 BPM | DIASTOLIC BLOOD PRESSURE: 75 MMHG | OXYGEN SATURATION: 99 % | HEIGHT: 65 IN | RESPIRATION RATE: 16 BRPM | TEMPERATURE: 98 F | BODY MASS INDEX: 36.49 KG/M2

## 2021-08-18 DIAGNOSIS — U07.1 COVID-19: Primary | ICD-10-CM

## 2021-08-18 PROCEDURE — 25000003 PHARM REV CODE 250: Performed by: INTERNAL MEDICINE

## 2021-08-18 PROCEDURE — 63600175 PHARM REV CODE 636 W HCPCS: Performed by: INTERNAL MEDICINE

## 2021-08-18 PROCEDURE — M0243 CASIRIVI AND IMDEVI INFUSION: HCPCS | Performed by: INTERNAL MEDICINE

## 2021-08-18 RX ORDER — ALBUTEROL SULFATE 90 UG/1
2 AEROSOL, METERED RESPIRATORY (INHALATION)
Status: ACTIVE | OUTPATIENT
Start: 2021-08-18

## 2021-08-18 RX ORDER — ONDANSETRON 4 MG/1
4 TABLET, ORALLY DISINTEGRATING ORAL ONCE AS NEEDED
Status: ACTIVE | OUTPATIENT
Start: 2021-08-18 | End: 2033-01-14

## 2021-08-18 RX ORDER — ACETAMINOPHEN 325 MG/1
650 TABLET ORAL ONCE AS NEEDED
Status: ACTIVE | OUTPATIENT
Start: 2021-08-18 | End: 2033-01-14

## 2021-08-18 RX ORDER — EPINEPHRINE 0.3 MG/.3ML
0.3 INJECTION SUBCUTANEOUS
Status: ACTIVE | OUTPATIENT
Start: 2021-08-18

## 2021-08-18 RX ORDER — DIPHENHYDRAMINE HYDROCHLORIDE 50 MG/ML
25 INJECTION INTRAMUSCULAR; INTRAVENOUS ONCE AS NEEDED
Status: ACTIVE | OUTPATIENT
Start: 2021-08-18 | End: 2033-01-14

## 2021-08-18 RX ORDER — SODIUM CHLORIDE 0.9 % (FLUSH) 0.9 %
10 SYRINGE (ML) INJECTION
Status: ACTIVE | OUTPATIENT
Start: 2021-08-18

## 2021-08-18 RX ADMIN — CASIRIVIMAB AND IMDEVIMAB 600 MG: 600; 600 INJECTION, SOLUTION, CONCENTRATE INTRAVENOUS at 10:08

## 2022-03-06 ENCOUNTER — HOSPITAL ENCOUNTER (EMERGENCY)
Facility: HOSPITAL | Age: 37
Discharge: HOME | End: 2022-03-06
Attending: EMERGENCY MEDICINE
Payer: MEDICAID

## 2022-03-06 VITALS
BODY MASS INDEX: 37.56 KG/M2 | WEIGHT: 220 LBS | HEIGHT: 64 IN | OXYGEN SATURATION: 100 % | DIASTOLIC BLOOD PRESSURE: 78 MMHG | SYSTOLIC BLOOD PRESSURE: 131 MMHG | HEART RATE: 112 BPM | RESPIRATION RATE: 16 BRPM | TEMPERATURE: 100 F

## 2022-03-06 DIAGNOSIS — N76.0 BACTERIAL VAGINOSIS: ICD-10-CM

## 2022-03-06 DIAGNOSIS — N39.0 URINARY TRACT INFECTION WITHOUT HEMATURIA, SITE UNSPECIFIED: ICD-10-CM

## 2022-03-06 DIAGNOSIS — Z20.2 POSSIBLE EXPOSURE TO STD: Primary | ICD-10-CM

## 2022-03-06 DIAGNOSIS — B96.89 BACTERIAL VAGINOSIS: ICD-10-CM

## 2022-03-06 LAB
B-HCG UR QL: NEGATIVE
BACTERIA #/AREA URNS HPF: ABNORMAL /HPF
BACTERIA GENITAL QL WET PREP: ABNORMAL
BILIRUB UR QL STRIP: NEGATIVE
CLARITY UR: ABNORMAL
CLUE CELLS VAG QL WET PREP: ABNORMAL
COLOR UR: YELLOW
CTP QC/QA: YES
FILAMENT FUNGI VAG WET PREP-#/AREA: ABNORMAL
GLUCOSE UR QL STRIP: NEGATIVE
HGB UR QL STRIP: ABNORMAL
KETONES UR QL STRIP: NEGATIVE
LEUKOCYTE ESTERASE UR QL STRIP: ABNORMAL
MICROSCOPIC COMMENT: ABNORMAL
NITRITE UR QL STRIP: POSITIVE
PH UR STRIP: 6 [PH] (ref 5–8)
PROT UR QL STRIP: ABNORMAL
RBC #/AREA URNS HPF: 6 /HPF (ref 0–4)
SP GR UR STRIP: 1.02 (ref 1–1.03)
SPECIMEN SOURCE: ABNORMAL
SQUAMOUS #/AREA URNS HPF: 46 /HPF
T VAGINALIS GENITAL QL WET PREP: ABNORMAL
URN SPEC COLLECT METH UR: ABNORMAL
UROBILINOGEN UR STRIP-ACNC: NEGATIVE EU/DL
WBC #/AREA URNS HPF: 24 /HPF (ref 0–5)
WBC #/AREA VAG WET PREP: ABNORMAL
YEAST GENITAL QL WET PREP: ABNORMAL

## 2022-03-06 PROCEDURE — 87210 SMEAR WET MOUNT SALINE/INK: CPT | Performed by: EMERGENCY MEDICINE

## 2022-03-06 PROCEDURE — 87088 URINE BACTERIA CULTURE: CPT | Performed by: EMERGENCY MEDICINE

## 2022-03-06 PROCEDURE — 81025 URINE PREGNANCY TEST: CPT | Performed by: EMERGENCY MEDICINE

## 2022-03-06 PROCEDURE — 87186 SC STD MICRODIL/AGAR DIL: CPT | Performed by: EMERGENCY MEDICINE

## 2022-03-06 PROCEDURE — 87591 N.GONORRHOEAE DNA AMP PROB: CPT | Performed by: EMERGENCY MEDICINE

## 2022-03-06 PROCEDURE — 87077 CULTURE AEROBIC IDENTIFY: CPT | Performed by: EMERGENCY MEDICINE

## 2022-03-06 PROCEDURE — 87086 URINE CULTURE/COLONY COUNT: CPT | Performed by: EMERGENCY MEDICINE

## 2022-03-06 PROCEDURE — 81000 URINALYSIS NONAUTO W/SCOPE: CPT | Performed by: EMERGENCY MEDICINE

## 2022-03-06 PROCEDURE — 99284 EMERGENCY DEPT VISIT MOD MDM: CPT | Mod: 25

## 2022-03-06 PROCEDURE — 87491 CHLMYD TRACH DNA AMP PROBE: CPT | Performed by: EMERGENCY MEDICINE

## 2022-03-06 RX ORDER — METRONIDAZOLE 500 MG/1
500 TABLET ORAL EVERY 12 HOURS
Qty: 14 TABLET | Refills: 0 | Status: SHIPPED | OUTPATIENT
Start: 2022-03-06 | End: 2022-03-13

## 2022-03-06 RX ORDER — NITROFURANTOIN 25; 75 MG/1; MG/1
100 CAPSULE ORAL 2 TIMES DAILY
Qty: 10 CAPSULE | Refills: 0 | Status: SHIPPED | OUTPATIENT
Start: 2022-03-06 | End: 2022-03-06 | Stop reason: SDUPTHER

## 2022-03-06 RX ORDER — NITROFURANTOIN 25; 75 MG/1; MG/1
100 CAPSULE ORAL 2 TIMES DAILY
Qty: 10 CAPSULE | Refills: 0 | Status: SHIPPED | OUTPATIENT
Start: 2022-03-06 | End: 2022-03-13

## 2022-03-06 RX ORDER — METRONIDAZOLE 500 MG/1
500 TABLET ORAL EVERY 12 HOURS
Qty: 14 TABLET | Refills: 0 | Status: SHIPPED | OUTPATIENT
Start: 2022-03-06 | End: 2022-03-06 | Stop reason: SDUPTHER

## 2022-03-06 NOTE — ED PROVIDER NOTES
Encounter Date: 3/6/2022    SCRIBE #1 NOTE: I, Pedro Schwartz, am scribing for, and in the presence of,  Coby Schuster MD. I have scribed the following portions of the note - Other sections scribed: HPI, ROS, PE.       History     Chief Complaint   Patient presents with    Exposure to STD     Pt presents to triage for a possible exposure to a STD r/o.  Pt denies any low abd pain, urinary s/s or vag discharge.  Pt is a/o x3 to person, place and time, resp easy, skin w/d.       Talia Colmenares is a 36 y,o. Female with no pertinent PMHx, who present to the ED for an evaluation of possible STD exposure. Patient was prompted to visit the ED after her partner began experiencing dysuria this morning. She notes a foul odor to her urine, but with no other associated symptoms. Patient denies vomiting, abdominal pain, fever, or vaginal discharge.     The history is provided by the patient. No  was used.     Review of patient's allergies indicates:   Allergen Reactions    Pcn [penicillins] Hives and Swelling     Past Medical History:   Diagnosis Date    GERD (gastroesophageal reflux disease)     History of bronchitis     as a child     History of pre-eclampsia in prior pregnancy, currently pregnant     history with previous two     Hypertension     high bp off and on with this pregnancy    Rectal bleeding      Past Surgical History:   Procedure Laterality Date     SECTION      x2    TUBAL LIGATION       Family History   Problem Relation Age of Onset    Asthma Mother     Hypertension Mother     Arthritis Father     Diabetes Father     Hyperlipidemia Father     Hypertension Father     Cancer Maternal Grandfather     Diabetes Maternal Grandfather     Heart disease Maternal Grandfather     Cancer Paternal Grandmother     Diabetes Paternal Grandmother      Social History     Tobacco Use    Smoking status: Current Every Day Smoker     Packs/day: 0.20     Types: Cigars    Smokeless  tobacco: Former User     Quit date: 10/30/2013   Substance Use Topics    Alcohol use: Yes     Alcohol/week: 0.0 standard drinks    Drug use: No     Review of Systems   Constitutional: Negative for chills and fever.   HENT: Negative for congestion.    Eyes: Negative for visual disturbance.   Respiratory: Negative for shortness of breath.    Cardiovascular: Negative for chest pain.   Gastrointestinal: Negative for abdominal pain and nausea.   Genitourinary: Negative for dysuria and vaginal discharge.        (+) malodorous urine   Skin: Negative for rash.   Neurological: Negative for headaches.   Psychiatric/Behavioral: Negative for decreased concentration.       Physical Exam     Initial Vitals [03/06/22 1542]   BP Pulse Resp Temp SpO2   131/78 (!) 112 16 99.5 °F (37.5 °C) 100 %      MAP       --         Physical Exam    Nursing note and vitals reviewed.  Constitutional: She appears well-developed and well-nourished. She is not diaphoretic. No distress.   HENT:   Head: Normocephalic and atraumatic.   Eyes: Conjunctivae are normal.   Cardiovascular: Normal rate and regular rhythm.   Pulmonary/Chest: Breath sounds normal.   Abdominal: Abdomen is soft. There is no abdominal tenderness.     Neurological: She is alert and oriented to person, place, and time. GCS score is 15. GCS eye subscore is 4. GCS verbal subscore is 5. GCS motor subscore is 6.   Skin: Skin is warm and dry.   Psychiatric: She has a normal mood and affect.         ED Course   Procedures  Labs Reviewed   URINALYSIS, REFLEX TO URINE CULTURE - Abnormal; Notable for the following components:       Result Value    Appearance, UA Hazy (*)     Protein, UA Trace (*)     Occult Blood UA 2+ (*)     Nitrite, UA Positive (*)     Leukocytes, UA 2+ (*)     All other components within normal limits    Narrative:     Specimen Source->Urine   VAGINAL SCREEN - Abnormal; Notable for the following components:    Clue Cells Few (*)     WBC - Vaginal Screen Occasional (*)      Bacteria - Vaginal Screen Few (*)     All other components within normal limits    Narrative:     Release to patient->Immediate   URINALYSIS MICROSCOPIC - Abnormal; Notable for the following components:    RBC, UA 6 (*)     WBC, UA 24 (*)     Bacteria Many (*)     All other components within normal limits    Narrative:     Specimen Source->Urine   C. TRACHOMATIS/N. GONORRHOEAE BY AMP DNA   CULTURE, URINE   POCT URINE PREGNANCY          Imaging Results    None          Medications - No data to display  Medical Decision Making:   Initial Assessment:   36-year-old female presents after possible exposure to STD.  She reports her partner reported symptoms of dysuria starting today.  She reports some odor change to her urine but specifically denies abdominal pain, vaginal discharge, pelvic pain.  Patient was tachycardic at triage but has a normal heart rate on my assessment.  Will send urine and wet prep, will send urine GC chlamydia.  Patient advised to avoid any sexual contact until both she and her partner have received test results.  I am not treating empirically as the patient has no vaginal discharge or pelvic discomfort.  ED Management:  Patient's urine shows nitrite positive, she has some clue cells on her wet prep but no Trichomonas.  GC and chlamydia are pending at time of discharge and she is aware of these pending test results.  I will start her on Flagyl and Macrobid, she is advised of stand from any alcohol use while taking Flagyl.  I also advised her to follow-up with OBGYN for further STD testing.          Scribe Attestation:   Scribe #1: I performed the above scribed service and the documentation accurately describes the services I performed. I attest to the accuracy of the note.                 Clinical Impression:   Final diagnoses:  [Z20.2] Possible exposure to STD (Primary)  [N39.0] Urinary tract infection without hematuria, site unspecified  [N76.0, B96.89] Bacterial vaginosis       Coby CASILLAS  MD Mariely, personally performed the services described in this documentation. All medical record entries made by the scribe were at my direction and in my presence. I have reviewed the chart and agree that the record reflects my personal performance and is accurate and complete.   ED Disposition Condition    Discharge Stable         This dictation has been generated using M-Modal Fluency Direct dictation; some phonetic errors may occur.     ED Prescriptions     Medication Sig Dispense Start Date End Date Auth. Provider    metroNIDAZOLE (FLAGYL) 500 MG tablet  (Status: Discontinued) Take 1 tablet (500 mg total) by mouth every 12 (twelve) hours. for 7 days 14 tablet 3/6/2022 3/6/2022 Coby Schuster MD    nitrofurantoin, macrocrystal-monohydrate, (MACROBID) 100 MG capsule  (Status: Discontinued) Take 1 capsule (100 mg total) by mouth 2 (two) times daily. for 7 days 10 capsule 3/6/2022 3/6/2022 Coby Schuster MD    metroNIDAZOLE (FLAGYL) 500 MG tablet Take 1 tablet (500 mg total) by mouth every 12 (twelve) hours. for 7 days 14 tablet 3/6/2022 3/13/2022 Coby Schuster MD    nitrofurantoin, macrocrystal-monohydrate, (MACROBID) 100 MG capsule Take 1 capsule (100 mg total) by mouth 2 (two) times daily. for 7 days 10 capsule 3/6/2022 3/13/2022 Coby Schuster MD        Follow-up Information     Follow up With Specialties Details Why Contact Info    Wilmar Jenkins MD Obstetrics and Gynecology Schedule an appointment as soon as possible for a visit  To recheck your symptoms [address] [place] [address] [address]  [address]  [facility] [address]  [phone]      [facility] - Emergency Dept Emergency Medicine  As needed, If symptoms worsen [address] [facility] [address] [phone]           Coby Schuster MD  03/06/22 4237

## 2022-03-07 LAB
C TRACH DNA SPEC QL NAA+PROBE: NOT DETECTED
N GONORRHOEA DNA SPEC QL NAA+PROBE: NOT DETECTED

## 2022-03-08 LAB — BACTERIA UR CULT: ABNORMAL

## 2023-08-20 ENCOUNTER — HOSPITAL ENCOUNTER (EMERGENCY)
Facility: HOSPITAL | Age: 38
Discharge: HOME | End: 2023-08-20
Attending: EMERGENCY MEDICINE
Payer: MEDICAID

## 2023-08-20 VITALS
DIASTOLIC BLOOD PRESSURE: 86 MMHG | TEMPERATURE: 99 F | HEART RATE: 107 BPM | OXYGEN SATURATION: 97 % | RESPIRATION RATE: 20 BRPM | SYSTOLIC BLOOD PRESSURE: 138 MMHG | BODY MASS INDEX: 36.7 KG/M2 | WEIGHT: 215 LBS | HEIGHT: 64 IN

## 2023-08-20 DIAGNOSIS — U07.1 COVID-19 VIRUS INFECTION: Primary | ICD-10-CM

## 2023-08-20 LAB
B-HCG UR QL: NEGATIVE
CTP QC/QA: YES
CTP QC/QA: YES
SARS-COV-2 RDRP RESP QL NAA+PROBE: POSITIVE

## 2023-08-20 PROCEDURE — 81025 URINE PREGNANCY TEST: CPT | Performed by: EMERGENCY MEDICINE

## 2023-08-20 PROCEDURE — 25000003 PHARM REV CODE 250: Performed by: PHYSICIAN ASSISTANT

## 2023-08-20 PROCEDURE — 99284 EMERGENCY DEPT VISIT MOD MDM: CPT

## 2023-08-20 PROCEDURE — 87635 SARS-COV-2 COVID-19 AMP PRB: CPT | Performed by: EMERGENCY MEDICINE

## 2023-08-20 RX ORDER — PREDNISONE 20 MG/1
60 TABLET ORAL DAILY
Qty: 9 TABLET | Refills: 0 | Status: SHIPPED | OUTPATIENT
Start: 2023-08-20 | End: 2023-08-23

## 2023-08-20 RX ORDER — PEDI MULTIVIT NO.27/FOLIC ACID 100 MCG
2 TABLET,CHEWABLE ORAL EVERY 4 HOURS PRN
Qty: 24 EACH | Refills: 0 | Status: SHIPPED | OUTPATIENT
Start: 2023-08-20 | End: 2023-08-27

## 2023-08-20 RX ORDER — OXYMETAZOLINE HCL 0.05 %
1 SPRAY, NON-AEROSOL (ML) NASAL 2 TIMES DAILY
Qty: 15 ML | Refills: 0 | Status: SHIPPED | OUTPATIENT
Start: 2023-08-20 | End: 2023-08-23

## 2023-08-20 RX ORDER — ACETAMINOPHEN 500 MG
1000 TABLET ORAL
Status: COMPLETED | OUTPATIENT
Start: 2023-08-20 | End: 2023-08-20

## 2023-08-20 RX ORDER — ALBUTEROL SULFATE 90 UG/1
1-2 AEROSOL, METERED RESPIRATORY (INHALATION) EVERY 6 HOURS PRN
Qty: 6.7 G | Refills: 1 | Status: SHIPPED | OUTPATIENT
Start: 2023-08-20

## 2023-08-20 RX ORDER — PROMETHAZINE HYDROCHLORIDE AND DEXTROMETHORPHAN HYDROBROMIDE 6.25; 15 MG/5ML; MG/5ML
5 SYRUP ORAL EVERY 4 HOURS PRN
Qty: 118 ML | Refills: 0 | Status: SHIPPED | OUTPATIENT
Start: 2023-08-20 | End: 2023-08-30

## 2023-08-20 RX ADMIN — ACETAMINOPHEN 1000 MG: 500 TABLET ORAL at 12:08

## 2023-08-20 NOTE — DISCHARGE INSTRUCTIONS
Thank you for coming to our Emergency Department today. It is important to remember that some problems or medical conditions are difficult to diagnose and may not be found or addressed during your Emergency Department visit.  These conditions often start with non-specific symptoms and can only be diagnosed on follow up visits with your primary care physician or specialist when the symptoms continue or change. Please remember that all medical conditions can change, and we cannot predict how you will be feeling tomorrow or the next day. Return to the ER with any questions/concerns, new/concerning symptoms, worsening or failure to improve.       Be sure to follow up with your primary care doctor and review all labs/imaging/tests that were performed during your ER visit with them. It is very common for us to identify non-emergent incidental findings which must be followed up with your primary care physician.  Some labs/imaging/tests may be outside of the normal range, and require non-emergent follow-up and/or further investigation/treatment/procedures/testing to help diagnose/exclude/prevent complications or other potentially serious medical conditions. Some abnormalities may not have been discussed or addressed during your ER visit.     An ER visit does not replace a primary care visit, and many screening tests or follow-up tests cannot be ordered by an ER doctor or performed by the ER. Some tests may even require pre-approval.    If you do not have a primary care doctor, you may contact the one listed on your discharge paperwork or you may also call the [facility] Appointment Desk at [phone] , or [address] [place] [address] at  [phone] to schedule an appointment, or establish care with a primary care doctor or even a specialist and to obtain information about local resources. It is important to your health that you have a primary care doctor.    Please take all medications as directed. We have done our best to select  a medication for you that will treat your condition however, all medications may potentially have side-effects and it is impossible to predict which medications may give you side-effects or what those side-effects (if any) those medications may give you.  If you feel that you are having a negative effect or side-effect of any medication you should stop taking those medications immediately and seek medical attention. If you feel that you are having a life-threatening reaction call 911.        Do not drive, swim, climb to height, take a bath, operate heavy machinery, drink alcohol or take potentially sedating medications, sign any legal documents or make any important decisions for 24 hours if you have received any pain medications, sedatives or mood altering drugs during your ER visit or within 24 hours of taking them if they have been prescribed to you.     You can find additional resources for Dentists, hearing aids, durable medical equipment, low cost pharmacies and other resources at [email or web address]

## 2023-08-20 NOTE — Clinical Note
"Talia "Monty Colmenares was seen and treated in our emergency department on 8/20/2023.     COVID-19 is present in our communities across the state. There is limited testing for COVID at this time, so not all patients can be tested. In this situation, your employee meets the following criteria:    Talia Colmenares has met the criteria for COVID-19 testing and has a POSITIVE result. She can return to work once they are asymptomatic for 24 hours without the use of fever reducing medications AND at least five days from the first positive result. A mask is recommended for 5 days post quarantine.     If you have any questions or concerns, or if I can be of further assistance, please do not hesitate to contact me.    Sincerely,             Nj Buck PA-C"

## 2023-08-20 NOTE — Clinical Note
"                        Talia "Monty Colmenares was seen and treated in our emergency department on 8/20/2023.     COVID-19 is present in our communities across the state. There is limited testing for COVID at this time, so not all patients can be tested. In this situation, your employee meets the following criteria:    Talia Colmenares has met the criteria for COVID-19 testing and has a POSITIVE result. She can return to work once they are asymptomatic for 24 hours without the use of fever reducing medications AND at least five days from the first positive result. A mask is recommended for 5 days post quarantine.     If you have any questions or concerns, or if I can be of further assistance, please do not hesitate to contact me.    Sincerely,             Nj Buck PA-C"

## 2023-08-20 NOTE — ED PROVIDER NOTES
"Encounter Date: 2023       History     Chief Complaint   Patient presents with    COVID-19 Concerns     Pt to ER with reports of cough, congestion, body aches and headache, no since  of smell x 1 day     37 y.o. female with no pertinent past medical history presents to emergency department for 1 day history of URI symptoms that include cough associated with nasal congestion, frontal headache, general myalgias, fever, chills, and mild "scratchy" sore throat.  Denies diarrhea, abdominal pain, and emesis. Tolerating PO. Denies CP and SOB. Notes sick contacts with similar symptoms. No medications taken prior to arrival for symptoms. No history of similar symptoms. No recent use of antibiotics, hospitalization, or history of pneumonia. Denies contact with environmental irritants and recent international travel. Denies foreign body sensation.        The history is provided by the patient.     Review of patient's allergies indicates:   Allergen Reactions    Pcn [penicillins] Hives and Swelling     Past Medical History:   Diagnosis Date    GERD (gastroesophageal reflux disease)     History of bronchitis     as a child     History of pre-eclampsia in prior pregnancy, currently pregnant     history with previous two     Hypertension     high bp off and on with this pregnancy    Rectal bleeding      Past Surgical History:   Procedure Laterality Date     SECTION      x2    TUBAL LIGATION       Family History   Problem Relation Age of Onset    Asthma Mother     Hypertension Mother     Arthritis Father     Diabetes Father     Hyperlipidemia Father     Hypertension Father     Cancer Maternal Grandfather     Diabetes Maternal Grandfather     Heart disease Maternal Grandfather     Cancer Paternal Grandmother     Diabetes Paternal Grandmother      Social History     Tobacco Use    Smoking status: Every Day     Current packs/day: 0.20     Types: Cigars, Cigarettes    Smokeless tobacco: Former     Quit date: 10/30/2013 "   Substance Use Topics    Alcohol use: Yes     Alcohol/week: 0.0 standard drinks of alcohol    Drug use: No     Review of Systems    Physical Exam     Initial Vitals [08/20/23 1126]   BP Pulse Resp Temp SpO2   138/86 (!) 120 20 98.8 °F (37.1 °C) 97 %      MAP       --         Physical Exam    Nursing note and vitals reviewed.  Constitutional: She appears well-developed and well-nourished. She is not diaphoretic. No distress.   HENT:   Head: Atraumatic.   Right Ear: External ear normal.   Left Ear: External ear normal.   Eyes: Conjunctivae and EOM are normal.   Neck: No tracheal deviation present.   Normal range of motion.  Cardiovascular:  Normal rate and regular rhythm.           Pulmonary/Chest: No accessory muscle usage or stridor. No tachypnea. No respiratory distress.   Musculoskeletal:         General: No edema. Normal range of motion.      Cervical back: Normal range of motion.     Neurological: She is alert and oriented to person, place, and time. She displays no tremor. She displays no seizure activity. Coordination and gait normal.   Skin: Skin is intact. Capillary refill takes less than 2 seconds. No rash noted. No erythema.         ED Course   Procedures  Labs Reviewed   SARS-COV-2 RDRP GENE - Abnormal; Notable for the following components:       Result Value    POC Rapid COVID Positive (*)     All other components within normal limits   POCT URINE PREGNANCY          Imaging Results    None          Medications   acetaminophen tablet 1,000 mg (1,000 mg Oral Given 8/20/23 1210)     Medical Decision Making  This is an emergent evaluation of a 37 y.o. female presenting to the ED for URI symptoms. Rapid COVID19 positive. No SOB or respiratory distress. No hypoxia. Low suspicion for bacterial PNA. Remains well appearing while in ED.     We discuss home quarantine. Advising follow-up with PCP. Strict return precautions discussed with patient who is agreeable to the plan.    I discussed the diagnosis, treatment  plan, indications for return to the emergency department, and for expected follow-up. Patient/caretaker verbalized an understanding. I have provided an opportunity to discuss questions and concerns. We discuss the case, until all issues are addressed to the patient's/caretaker's satisfaction. Patient/caretaker understands and is agreeable to the plan.       Amount and/or Complexity of Data Reviewed  Labs: ordered.    Risk  OTC drugs.  Prescription drug management.                               Clinical Impression:   Final diagnoses:  [U07.1] COVID-19 virus infection (Primary)        ED Disposition Condition    Discharge Stable          ED Prescriptions       Medication Sig Dispense Start Date End Date Auth. Provider    benzocaine-menthoL 15-20 mg Lozg Follows directions on packaging 16 lozenge 8/20/2023 -- Nj Buck PA-C    oxymetazoline (AFRIN) 0.05 % nasal spray 1 spray by Nasal route 2 (two) times daily. DO NOT EXCEED USE FOR MORE THAN 3 DAYS IN A ROW. for 3 days 15 mL 8/20/2023 8/23/2023 Nj Buck PA-C    PE-DM-ASA/qyjffq-CC-SL-ASA (KALEN-SELTZER PLUS DAY-NIGHT) 7.8-325 mg(d)/ 6. mg(nt) TEfS Take 2 capsules by mouth every 4 (four) hours as needed. Do not exceed more than 10 capsules in 24 hours. Do not exceed recommended dose. Children under 12 years of age:  DO NOT USE. 24 each 8/20/2023 8/27/2023 Nj Buck PA-C    promethazine-dextromethorphan (PROMETHAZINE-DM) 6.25-15 mg/5 mL Syrp Take 5 mLs by mouth every 4 (four) hours as needed (cough). 118 mL 8/20/2023 8/30/2023 Nj Buck PA-C    albuterol (PROVENTIL/VENTOLIN HFA) 90 mcg/actuation inhaler Inhale 1-2 puffs into the lungs every 6 (six) hours as needed for Wheezing. Rescue 6.7 g 8/20/2023 -- Nj Buck PA-C    predniSONE (DELTASONE) 20 MG tablet Take 3 tablets (60 mg total) by mouth once daily. for 3 days 9 tablet 8/20/2023 8/23/2023 Nj Buck, PA-C          Follow-up Information       Follow up With  Specialties Details Why Contact Info    [facility] -  Schedule an appointment as soon as possible for a visit in 1 day For reevaluation, AND to establish primary if you don't have a  [address]  [facility] [id number] [phone]      [facility] Emergency Dept Emergency Medicine Go to  If symptoms worsen or new symptoms develop [address]  [facility] [address] [phone]             Nj Buck PAWileyC  08/20/23 1223

## 2023-08-27 ENCOUNTER — HOSPITAL ENCOUNTER (EMERGENCY)
Facility: HOSPITAL | Age: 38
Discharge: HOME | End: 2023-08-27
Attending: EMERGENCY MEDICINE
Payer: MEDICAID

## 2023-08-27 VITALS
BODY MASS INDEX: 36.7 KG/M2 | TEMPERATURE: 99 F | DIASTOLIC BLOOD PRESSURE: 78 MMHG | WEIGHT: 215 LBS | SYSTOLIC BLOOD PRESSURE: 145 MMHG | HEART RATE: 100 BPM | OXYGEN SATURATION: 97 % | HEIGHT: 64 IN | RESPIRATION RATE: 18 BRPM

## 2023-08-27 DIAGNOSIS — J06.9 VIRAL URI: Primary | ICD-10-CM

## 2023-08-27 PROCEDURE — 99281 EMR DPT VST MAYX REQ PHY/QHP: CPT

## 2023-08-27 NOTE — Clinical Note
"Talia Tracey" Dedra was seen and treated in our emergency department on 8/27/2023.  She may return to work on 08/28/2023.       If you have any questions or concerns, please don't hesitate to call.      Yao Nunes, NP" wheelchair

## 2023-08-27 NOTE — DISCHARGE INSTRUCTIONS

## 2023-08-27 NOTE — ED PROVIDER NOTES
Encounter Date: 2023    SCRIBE #1 NOTE: I, Raymond Thorne, am scribing for, and in the presence of,  Yao Nunes NP. I have scribed the following portions of the note - Other sections scribed: HPI, ROS.       History     Chief Complaint   Patient presents with    COVID-19 Concerns     Patient reports covid exposure, has cough congestion and slight temp     CC: Congestion    HPI: Talia Colmenares is a 37 y.o. female who presents to the ED for evaluation of congestion onset a week ago. Pt reports testing COVID-positive a week ago. Pt denies any aggravating/alleviating factors. Pt denies taking any medications for their symptoms. Pt denies nausea, vomiting, diarrhea, or any other associated symptoms. Pt denies any known allergies.         The history is provided by the patient. No  was used.     Review of patient's allergies indicates:   Allergen Reactions    Pcn [penicillins] Hives and Swelling     Past Medical History:   Diagnosis Date    GERD (gastroesophageal reflux disease)     History of bronchitis     as a child     History of pre-eclampsia in prior pregnancy, currently pregnant     history with previous two     Hypertension     high bp off and on with this pregnancy    Rectal bleeding      Past Surgical History:   Procedure Laterality Date     SECTION      x2    TUBAL LIGATION       Family History   Problem Relation Age of Onset    Asthma Mother     Hypertension Mother     Arthritis Father     Diabetes Father     Hyperlipidemia Father     Hypertension Father     Cancer Maternal Grandfather     Diabetes Maternal Grandfather     Heart disease Maternal Grandfather     Cancer Paternal Grandmother     Diabetes Paternal Grandmother      Social History     Tobacco Use    Smoking status: Every Day     Current packs/day: 0.20     Types: Cigars, Cigarettes    Smokeless tobacco: Former     Quit date: 10/30/2013   Substance Use Topics    Alcohol use: Yes     Alcohol/week: 0.0 standard drinks  of alcohol    Drug use: No     Review of Systems   Constitutional:  Negative for chills and fever.   HENT:  Positive for congestion. Negative for ear pain, rhinorrhea, sore throat and trouble swallowing.    Eyes:  Negative for pain, discharge and redness.   Respiratory:  Negative for cough and shortness of breath.    Cardiovascular:  Negative for chest pain.   Gastrointestinal:  Negative for abdominal pain, diarrhea, nausea and vomiting.   Genitourinary:  Negative for decreased urine volume and dysuria.   Musculoskeletal:  Negative for back pain, neck pain and neck stiffness.   Skin:  Negative for rash.   Neurological:  Negative for dizziness, weakness, light-headedness, numbness and headaches.   Psychiatric/Behavioral:  Negative for confusion.        Physical Exam     Initial Vitals [08/27/23 1232]   BP Pulse Resp Temp SpO2   (!) 145/78 100 18 98.7 °F (37.1 °C) 97 %      MAP       --         Physical Exam    Nursing note and vitals reviewed.  Constitutional: She appears well-developed and well-nourished. She is not diaphoretic. No distress.   HENT:   Head: Normocephalic and atraumatic.   Right Ear: External ear normal.   Left Ear: External ear normal.   Nose: Nose normal.   Eyes: Conjunctivae and EOM are normal. Right eye exhibits no discharge. Left eye exhibits no discharge.   Neck: Neck supple. No tracheal deviation present.   Normal range of motion.  Cardiovascular:  Normal rate.           Pulmonary/Chest: No stridor. No respiratory distress.   Abdominal: Abdomen is soft. She exhibits no distension. There is no abdominal tenderness.   Musculoskeletal:         General: No tenderness. Normal range of motion.      Cervical back: Normal range of motion and neck supple.     Neurological: She is alert and oriented to person, place, and time. She has normal strength. No cranial nerve deficit or sensory deficit.   Skin: Skin is warm and dry.   Psychiatric: She has a normal mood and affect. Her behavior is normal.  Judgment and thought content normal.         ED Course   Procedures  Labs Reviewed - No data to display       Imaging Results    None          Medications - No data to display  Medical Decision Making  DDx:  Influenza, viral syndrome, COVID, strep pharyngitis, viral pharyngitis, otitis media, sinusitis, pneumonia, bronchitis, meningitis, sepsis, others    HPI and physical exam as above.      The patient appears to have a viral upper respiratory infection.  Based upon the history and physical exam the patient does not appear to have a serious bacterial infection such as pneumonia, sepsis, otitis media, bacterial sinusitis, strep pharyngitis, parapharyngeal or peritonsillar abscess, meningitis. Respiratory effort is normal with no signs of increased work of breathing or respiratory distress. Lungs are clear to auscultation bilaterally in all fields. Mucous membranes are moist and the patient is tolerating P.O. without difficulty.  Patient is afebrile.  Patient is nontoxic, alert, active, and appears very well at this time just prior to discharge. I have given specific return precautions to the patient.     The results and physical exam findings were reviewed with the patient. Advised them to follow up with her PCP for re-evaluation and further management.  ED return precautions given. All questions regarding diagnosis and plan were answered to the patient's fullest possible satisfaction. Patient expressed understanding of diagnosis, discharge instructions, and return precautions.    Amount and/or Complexity of Data Reviewed  Labs: ordered. Decision-making details documented in ED Course.            Scribe Attestation:   Scribe #1: I performed the above scribed service and the documentation accurately describes the services I performed. I attest to the accuracy of the note.                      Scribe attestation: I, Yoa Nunes NP, personally performed the services described in this documentation.  All medical  record entries made by the scribe were at my direction and in my presence.  I have reviewed the chart and agree that the record reflects my personal performance and is accurate and complete.    Clinical Impression:   Final diagnoses:  [J06.9] Viral URI (Primary)        ED Disposition Condition    Discharge Stable          ED Prescriptions    None       Follow-up Information       Follow up With Specialties Details Why Contact Info    [facility] Emergency Dept Emergency Medicine Go to  If symptoms worsen, As needed [address] [facility] [id number] [phone]             Yao Nunes, NP  08/27/23 7093

## 2023-10-29 ENCOUNTER — HOSPITAL ENCOUNTER (EMERGENCY)
Facility: HOSPITAL | Age: 38
Discharge: HOME | End: 2023-10-29
Attending: EMERGENCY MEDICINE
Payer: MEDICAID

## 2023-10-29 VITALS
SYSTOLIC BLOOD PRESSURE: 134 MMHG | WEIGHT: 216 LBS | RESPIRATION RATE: 20 BRPM | OXYGEN SATURATION: 98 % | HEIGHT: 65 IN | HEART RATE: 98 BPM | TEMPERATURE: 98 F | BODY MASS INDEX: 35.99 KG/M2 | DIASTOLIC BLOOD PRESSURE: 86 MMHG

## 2023-10-29 DIAGNOSIS — J02.9 VIRAL PHARYNGITIS: Primary | ICD-10-CM

## 2023-10-29 LAB
CTP QC/QA: YES
MOLECULAR STREP A: NEGATIVE
POC MOLECULAR INFLUENZA A AGN: NEGATIVE
POC MOLECULAR INFLUENZA B AGN: NEGATIVE
SARS-COV-2 RDRP RESP QL NAA+PROBE: NEGATIVE

## 2023-10-29 PROCEDURE — 99284 EMERGENCY DEPT VISIT MOD MDM: CPT

## 2023-10-29 PROCEDURE — 87635 SARS-COV-2 COVID-19 AMP PRB: CPT | Performed by: EMERGENCY MEDICINE

## 2023-10-29 PROCEDURE — 87651 STREP A DNA AMP PROBE: CPT

## 2023-10-29 PROCEDURE — 87502 INFLUENZA DNA AMP PROBE: CPT

## 2023-10-29 RX ORDER — GUAIFENESIN/DEXTROMETHORPHAN 100-10MG/5
5 SYRUP ORAL EVERY 6 HOURS PRN
Qty: 473 ML | Refills: 0 | Status: SHIPPED | OUTPATIENT
Start: 2023-10-29 | End: 2023-11-08

## 2023-10-29 RX ORDER — ACETAMINOPHEN 500 MG
1000 TABLET ORAL EVERY 6 HOURS PRN
Qty: 56 TABLET | Refills: 0 | Status: SHIPPED | OUTPATIENT
Start: 2023-10-29 | End: 2023-11-05

## 2023-10-29 RX ORDER — CETIRIZINE HYDROCHLORIDE 10 MG/1
10 TABLET ORAL DAILY
Qty: 30 TABLET | Refills: 0 | Status: SHIPPED | OUTPATIENT
Start: 2023-10-29 | End: 2024-10-28

## 2023-10-29 RX ORDER — BENZONATATE 200 MG/1
200 CAPSULE ORAL 3 TIMES DAILY PRN
Qty: 30 CAPSULE | Refills: 0 | Status: SHIPPED | OUTPATIENT
Start: 2023-10-29 | End: 2023-11-08

## 2023-10-29 RX ORDER — FLUTICASONE PROPIONATE 50 MCG
2 SPRAY, SUSPENSION (ML) NASAL DAILY
Qty: 15.8 ML | Refills: 0 | Status: SHIPPED | OUTPATIENT
Start: 2023-10-29 | End: 2023-11-28

## 2023-10-29 RX ORDER — IBUPROFEN 600 MG/1
600 TABLET ORAL EVERY 6 HOURS PRN
Qty: 20 TABLET | Refills: 0 | Status: SHIPPED | OUTPATIENT
Start: 2023-10-29

## 2023-10-29 NOTE — Clinical Note
"Talia Traceyginna Colmenares was seen and treated in our emergency department on 10/29/2023.  She may return to work on 10/31/2023.       If you have any questions or concerns, please don't hesitate to call.      Zion Carpenter PA-C"

## 2023-10-29 NOTE — DISCHARGE INSTRUCTIONS

## 2023-10-29 NOTE — ED PROVIDER NOTES
Encounter Date: 10/29/2023    SCRIBE #1 NOTE: I, Lei Arita Do, am scribing for, and in the presence of,  Zion Carpenter PA-C. I have scribed the following portions of the note - Other sections scribed: DENZEL LINTON.   SCRIBE #2 NOTE: I, Renata Parmar, am scribing for, and in the presence of,  Zion Carpenter PA-C.     History     Chief Complaint   Patient presents with    Cough     Cough, sore throat, congestion x 2 days.      Talia Colmenares is a 37 y.o. female, with a past medical history of bronchitis and HTN, who presents to the ED with a cough onset 2 days ago. She notes associated sore throat, congestion, rhinorrhea, and sneezing. Patient reports + sick contact with her children. No other exacerbating or alleviating factors. Patient denies headache, fatigue, or other associated symptoms.     The history is provided by the patient. No  was used.     Review of patient's allergies indicates:   Allergen Reactions    Pcn [penicillins] Hives and Swelling     Past Medical History:   Diagnosis Date    GERD (gastroesophageal reflux disease)     History of bronchitis     as a child     History of pre-eclampsia in prior pregnancy, currently pregnant     history with previous two     Hypertension     high bp off and on with this pregnancy    Rectal bleeding      Past Surgical History:   Procedure Laterality Date     SECTION      x2    TUBAL LIGATION       Family History   Problem Relation Age of Onset    Asthma Mother     Hypertension Mother     Arthritis Father     Diabetes Father     Hyperlipidemia Father     Hypertension Father     Cancer Maternal Grandfather     Diabetes Maternal Grandfather     Heart disease Maternal Grandfather     Cancer Paternal Grandmother     Diabetes Paternal Grandmother      Social History     Tobacco Use    Smoking status: Every Day     Current packs/day: 0.20     Types: Cigars, Cigarettes    Smokeless tobacco: Former     Quit date: 10/30/2013   Substance Use  Topics    Alcohol use: Yes     Alcohol/week: 0.0 standard drinks of alcohol    Drug use: No     Review of Systems   Constitutional:  Negative for chills, diaphoresis, fatigue and fever.   HENT:  Positive for congestion, rhinorrhea, sneezing and sore throat.    Eyes:  Negative for photophobia and visual disturbance.   Respiratory:  Positive for cough. Negative for shortness of breath and wheezing.    Cardiovascular:  Negative for chest pain and leg swelling.   Gastrointestinal:  Negative for abdominal pain, blood in stool, constipation, diarrhea, nausea and vomiting.   Genitourinary:  Negative for dysuria, frequency, hematuria and urgency.   Musculoskeletal:  Negative for neck pain and neck stiffness.   Skin:  Negative for rash and wound.   Neurological:  Negative for weakness, light-headedness, numbness and headaches.   Psychiatric/Behavioral:  Negative for confusion and suicidal ideas.        Physical Exam     Initial Vitals [10/29/23 1144]   BP Pulse Resp Temp SpO2   134/86 98 20 98.1 °F (36.7 °C) 98 %      MAP       --         Physical Exam    Nursing note and vitals reviewed.  Constitutional: She appears well-developed and well-nourished. She is not diaphoretic. She is cooperative. She does not appear ill. No distress.   HENT:   Head: Normocephalic and atraumatic.   Right Ear: Hearing, tympanic membrane, external ear and ear canal normal.   Left Ear: Hearing, tympanic membrane, external ear and ear canal normal.   Nose: Mucosal edema and rhinorrhea present. No sinus tenderness. Right sinus exhibits no maxillary sinus tenderness and no frontal sinus tenderness. Left sinus exhibits no maxillary sinus tenderness and no frontal sinus tenderness.   Mouth/Throat: Oropharynx is clear and moist and mucous membranes are normal. No posterior oropharyngeal edema, posterior oropharyngeal erythema or tonsillar abscesses.    Nasal congestion.   Eyes: Conjunctivae and EOM are normal. Pupils are equal, round, and reactive to  light.   Neck: Neck supple.    Full passive range of motion without pain.     Cardiovascular:  Normal rate, regular rhythm, S1 normal, S2 normal, normal heart sounds and normal pulses.           No murmur heard.  Pulses:       Radial pulses are 2+ on the right side and 2+ on the left side.        Dorsalis pedis pulses are 2+ on the right side and 2+ on the left side.   Pulmonary/Chest: Effort normal and breath sounds normal. No accessory muscle usage. No respiratory distress.     Good air movement.  No adventitious sounds of breathing.  Respirations are even and unlabored.   Abdominal: Abdomen is soft and flat. She exhibits no distension. There is no abdominal tenderness.   Musculoskeletal:      Cervical back: Full passive range of motion without pain and neck supple. No edema or rigidity. No muscular tenderness. Normal range of motion.     Neurological: She is alert.   Skin: Skin is warm and dry. Capillary refill takes less than 2 seconds. No abrasion, no lesion and no rash noted.         ED Course   Procedures  Labs Reviewed   SARS-COV-2 RDRP GENE   POCT INFLUENZA A/B MOLECULAR   POCT STREP A MOLECULAR          Imaging Results    None          Medications - No data to display  Medical Decision Making  37-year-old female presenting to the emergency department with a chief complaint of upper respiratory symptoms.  Her children are sick with similar symptoms and are also patients here.  Denies any chest pain, shortness of breath.  On physical exam, she was clinically well-appearing and in no acute distress. Vital signs within normal limits. Some nasal congestion, nasal mucosal edema, and rhinorrhea noted.  Coughed a few times during my exam.  Good air movement, no adventitious sounds of breathing.  The rest of the physical exam is within normal limits.      Differential diagnosis includes but is not limited to respiratory infections including COVID, flu, bronchitis, rhinosinusitis, or pneumonia, or noninfectious  processes such as asthma, COPD or seasonal allergies.     Patient tested negative for COVID, flu, strep.  Presentation is consistent with another viral upper respiratory infection.  No convincing signs of bacterial infection.  Discussed supportive care including alternating Motrin and Tylenol to control fever, maintaining adequate hydration, and other medications as needed for symptomatic control.  Stable for discharge at this time.    Return precautions were discussed, all patient questions were answered, and the patient was agreeable to the plan of care.  She was discharged home in stable condition and will follow up with her primary care provider or return to the emergency department if her symptoms worsen or do not improve.     Amount and/or Complexity of Data Reviewed  Labs: ordered. Decision-making details documented in ED Course.    Risk  OTC drugs.  Prescription drug management.  Diagnosis or treatment significantly limited by social determinants of health.            Scribe Attestation:   Scribe #1: I performed the above scribed service and the documentation accurately describes the services I performed. I attest to the accuracy of the note.                      I, Zion Carpenter PA-C, personally performed the services described in this documentation. All medical record entries made by the scribe were at my direction and in my presence. I have reviewed the chart and agree that the record reflects my personal performance and is accurate and complete.    Clinical Impression:   Final diagnoses:  [J02.9] Viral pharyngitis (Primary)        ED Disposition Condition    Discharge Stable          ED Prescriptions       Medication Sig Dispense Start Date End Date Auth. Provider    fluticasone propionate (FLONASE) 50 mcg/actuation nasal spray 2 sprays (100 mcg total) by Each Nostril route once daily. 15.8 mL 10/29/2023 11/28/2023 Zion Carpenter PA-C    cetirizine (ZYRTEC) 10 MG tablet Take 1 tablet (10 mg total)  by mouth once daily. 30 tablet 10/29/2023 10/28/2024 Zion Carpenter, PA-C    benzocaine-menthoL 6-10 mg lozenge Take 1 lozenge by mouth every 2 (two) hours as needed. 36 tablet 10/29/2023 -- Zion Carpenter, CHANCE    dextromethorphan-guaiFENesin  mg/5 ml (ROBITUSSIN-DM)  mg/5 mL liquid Take 5 mLs by mouth every 6 (six) hours as needed (cough). 473 mL 10/29/2023 11/8/2023 Zion Carpenter, CHANCE    benzonatate (TESSALON) 200 MG capsule Take 1 capsule (200 mg total) by mouth 3 (three) times daily as needed for Cough. 30 capsule 10/29/2023 11/8/2023 Zion Carpenter, CHANCE    ibuprofen (ADVIL,MOTRIN) 600 MG tablet Take 1 tablet (600 mg total) by mouth every 6 (six) hours as needed for Pain. 20 tablet 10/29/2023 -- Zion Carpenter, CHANCE    acetaminophen (TYLENOL) 500 MG tablet Take 2 tablets (1,000 mg total) by mouth every 6 (six) hours as needed for Pain. 56 tablet 10/29/2023 11/5/2023 Zion Carpenter, CHANCE          Follow-up Information       Follow up With Specialties Details Why Contact [facility] -  Schedule an appointment as soon as possible for a visit  As needed, If symptoms worsen [address] [facility] [address]  [phone]      [facility] - Emergency Dept Emergency Medicine Go to  If symptoms worsen [address]  [facility] [address] [phone]             Zion Carpenter PA-C  10/29/23 0599

## 2024-03-26 ENCOUNTER — HOSPITAL ENCOUNTER (EMERGENCY)
Facility: HOSPITAL | Age: 39
Discharge: HOME | End: 2024-03-26
Attending: EMERGENCY MEDICINE
Payer: MEDICAID

## 2024-03-26 VITALS
TEMPERATURE: 98 F | SYSTOLIC BLOOD PRESSURE: 125 MMHG | DIASTOLIC BLOOD PRESSURE: 87 MMHG | OXYGEN SATURATION: 99 % | BODY MASS INDEX: 36.33 KG/M2 | WEIGHT: 215 LBS | RESPIRATION RATE: 20 BRPM | HEART RATE: 87 BPM

## 2024-03-26 DIAGNOSIS — M25.472 ANKLE SWELLING, LEFT: Primary | ICD-10-CM

## 2024-03-26 DIAGNOSIS — M79.673 HEEL PAIN: ICD-10-CM

## 2024-03-26 LAB
B-HCG UR QL: NEGATIVE
CTP QC/QA: YES

## 2024-03-26 PROCEDURE — 81025 URINE PREGNANCY TEST: CPT | Mod: ER | Performed by: EMERGENCY MEDICINE

## 2024-03-26 PROCEDURE — 25000003 PHARM REV CODE 250: Mod: ER

## 2024-03-26 PROCEDURE — 99284 EMERGENCY DEPT VISIT MOD MDM: CPT | Mod: 25,ER

## 2024-03-26 PROCEDURE — 29515 APPLICATION SHORT LEG SPLINT: CPT | Mod: LT,ER

## 2024-03-26 RX ORDER — IBUPROFEN 400 MG/1
800 TABLET ORAL
Status: COMPLETED | OUTPATIENT
Start: 2024-03-26 | End: 2024-03-26

## 2024-03-26 RX ORDER — DICLOFENAC SODIUM 10 MG/G
2 GEL TOPICAL 2 TIMES DAILY
Qty: 20 G | Refills: 0 | Status: SHIPPED | OUTPATIENT
Start: 2024-03-26

## 2024-03-26 RX ORDER — METHYLPREDNISOLONE 4 MG/1
TABLET ORAL
Qty: 21 EACH | Refills: 0 | Status: SHIPPED | OUTPATIENT
Start: 2024-03-26 | End: 2024-04-16

## 2024-03-26 RX ORDER — INDOMETHACIN 50 MG/1
50 CAPSULE ORAL 3 TIMES DAILY
Qty: 21 CAPSULE | Refills: 0 | Status: SHIPPED | OUTPATIENT
Start: 2024-03-26 | End: 2024-04-02

## 2024-03-26 RX ADMIN — IBUPROFEN 800 MG: 600 TABLET ORAL at 02:03

## 2024-03-26 NOTE — Clinical Note
"Talia Traceyginna Colmenares was seen and treated in our emergency department on 3/26/2024.  She may return to work on 03/29/2024.       If you have any questions or concerns, please don't hesitate to call.      Trish Meeks PA-C"

## 2024-03-26 NOTE — DISCHARGE INSTRUCTIONS
Apply a compressive ACE bandage. Rest and elevate the affected painful area.  Apply cold compresses intermittently as needed.  As pain recedes, begin normal activities slowly as tolerated.  Call if symptoms persist.    Take all medications as prescribed.    Follow up with Orthopedic for further evaluation management as needed.  Referral placed.

## 2024-04-08 NOTE — ED PROVIDER NOTES
Encounter Date: 3/26/2024       History     Chief Complaint   Patient presents with    Foot Injury     Pt reports left foot pain starting yesterday   Denies injury      37 y/o female with PMH of HTN and GERD presents for emergent evaluation left foot and ankle pain intermittently X 4 days but severely worsening yesterday.  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  She states that she just came straight from work this morning.  She states that pain originally began as a swelling fullness but now has become unbearable and is worsened with bearing weight.  She reports pain to left heel with increased swelling.  Denies any known injury.  Denies any injury or trauma in the past.  Denies any decreased sensation or strength.  She states that she wears very supportive work crocs while on shift.  She has been in his field of work for many years in his very active.  She is taken Tylenol with little to no relief.  Denies any knee, hip pain.  Denies any other complaints at this time.      Review of patient's allergies indicates:   Allergen Reactions    Pcn [penicillins] Hives and Swelling     Past Medical History:   Diagnosis Date    GERD (gastroesophageal reflux disease)     History of bronchitis     as a child     History of pre-eclampsia in prior pregnancy, currently pregnant     history with previous two     Hypertension     high bp off and on with this pregnancy    Rectal bleeding      Past Surgical History:   Procedure Laterality Date     SECTION      x2    TUBAL LIGATION       Family History   Problem Relation Age of Onset    Asthma Mother     Hypertension Mother     Arthritis Father     Diabetes Father     Hyperlipidemia Father     Hypertension Father     Cancer Maternal Grandfather     Diabetes Maternal Grandfather     Heart disease Maternal Grandfather     Cancer Paternal Grandmother     Diabetes Paternal Grandmother      Social  History     Tobacco Use    Smoking status: Every Day     Current packs/day: 0.20     Types: Cigars, Cigarettes    Smokeless tobacco: Former     Quit date: 10/30/2013   Substance Use Topics    Alcohol use: Yes     Alcohol/week: 0.0 standard drinks of alcohol    Drug use: No     Review of Systems   Constitutional:  Negative for chills and fever.   HENT:  Negative for congestion, ear pain, rhinorrhea, sore throat and trouble swallowing.    Respiratory:  Negative for cough and shortness of breath.    Cardiovascular:  Negative for chest pain.   Gastrointestinal:  Negative for abdominal pain, constipation, diarrhea, nausea and vomiting.   Genitourinary:  Negative for decreased urine volume, dysuria, frequency, hematuria and urgency.   Musculoskeletal:  Positive for arthralgias, gait problem and joint swelling. Negative for back pain.   Skin:  Negative for rash.   Neurological:  Negative for dizziness, weakness, numbness and headaches.       Physical Exam     Initial Vitals [03/26/24 1119]   BP Pulse Resp Temp SpO2   122/86 90 20 98 °F (36.7 °C) 99 %      MAP       --         Physical Exam    Nursing note and vitals reviewed.  Constitutional: She appears well-developed and well-nourished. She is not diaphoretic. No distress.   HENT:   Head: Normocephalic.   Right Ear: External ear normal.   Left Ear: External ear normal.   Eyes: Conjunctivae are normal. Right eye exhibits no discharge. Left eye exhibits no discharge. No scleral icterus.   Neck: No tracheal deviation present.   Normal range of motion.  Cardiovascular:  Normal rate and regular rhythm.           Pulmonary/Chest: No stridor. No respiratory distress.   Musculoskeletal:         General: Tenderness and edema present. Normal range of motion.      Cervical back: Normal range of motion.      Comments: Left Ankle:  No obvious deformity. There is  swelling and tenderness to medial malleolus.  No bony tenderness over navicular.  No tenderness over the base of the 5th  metatarsal.  No proximal fibular tenderness. She is able to bear weight although limp.   Ankle dorsiflexion and ankle plantar flexion are intact.  Intact sensation to light touch. Tenderness on plantar surface of foot. Worse with dorsiflexion. TTP medical calcaneus. Slight increased warmth. Distal capillary refill takes less than 2 seconds.  PT and DP pulses are 2+ bilaterally.       Neurological: She is alert and oriented to person, place, and time. She has normal strength. No sensory deficit.   Skin: Skin is warm and dry. No rash noted. No erythema.   Psychiatric: She has a normal mood and affect. Her behavior is normal. Judgment and thought content normal.         ED Course   Procedures  Labs Reviewed   POCT URINE PREGNANCY          Imaging Results              X-Ray Foot Complete Left (Final result)  Result time 03/26/24 13:29:38      Final result by Nik Gray MD (03/26/24 13:29:38)                   Impression:      Moderate soft tissue swelling over the medial ankle, without evidence of a displaced fracture or dislocation.      Electronically signed by: Nik Gray MD  Date:    03/26/2024  Time:    13:29               Narrative:    EXAMINATION:  XR ANKLE COMPLETE 3 VIEW LEFT; XR FOOT COMPLETE 3 VIEW LEFT    CLINICAL HISTORY:  Effusion, left ankle; Pain in unspecified foot    TECHNIQUE:  Three views of the left foot and ankle were performed.    COMPARISON:  None    FINDINGS:  No evidence of a displaced fracture or focal osseous destructive process.    No evidence of dislocation.    No advanced degenerative change.    Moderate soft tissue swelling over the medial ankle                                       X-Ray Ankle Complete Left (Final result)  Result time 03/26/24 13:29:38      Final result by Nik Gray MD (03/26/24 13:29:38)                   Impression:      Moderate soft tissue swelling over the medial ankle, without evidence of a displaced fracture or dislocation.      Electronically  signed by: Nik Gray MD  Date:    03/26/2024  Time:    13:29               Narrative:    EXAMINATION:  XR ANKLE COMPLETE 3 VIEW LEFT; XR FOOT COMPLETE 3 VIEW LEFT    CLINICAL HISTORY:  Effusion, left ankle; Pain in unspecified foot    TECHNIQUE:  Three views of the left foot and ankle were performed.    COMPARISON:  None    FINDINGS:  No evidence of a displaced fracture or focal osseous destructive process.    No evidence of dislocation.    No advanced degenerative change.    Moderate soft tissue swelling over the medial ankle                                       Medications   ibuprofen tablet 800 mg (800 mg Oral Given 3/26/24 1404)     Medical Decision Making  This is an emergent evaluation of a 38 y.o. female presenting to the ED for left ankle pain. Denies other injury, hip pain, knee pain, fever, and numbness/tingling. Afebrile. Patient is non-toxic appearing and in no acute distress.    On exam, no obvious deformity. There is  swelling and tenderness to medial malleolus.  No bony tenderness over navicular.  No tenderness over the base of the 5th metatarsal.  No proximal fibular tenderness. She is able to bear weight although limp.  Ankle dorsiflexion and ankle plantar flexion are intact.  Intact sensation to light touch. Tenderness on plantar surface of foot. Worse with dorsiflexion. TTP medical calcaneus. Slight increased warmth.  Xray shows no acute fracture or dislocation. Moderate soft tissue swelling over the medial ankle. No neurovascular compromise. Presentation most consistent with plantar fascitis vs gout. Given the above, I have considered but doubt septic joint, achilles tendon rupture, DVT, avascular necrosis.    Pain controlled in ED. Placed in stirrup splint for support and crutches. Discharged home with supportive care. I discussed RICE treatment with the patient and issued the patient an educational handout on self care for ankle injuries. Instructed to follow up with PCP and orthopedics  for reevaluation and management of symptoms. Referral placed.   I discussed with the patient the diagnosis, treatment plan, indications for return to the emergency department, and for expected follow-up. The patient verbalized an understanding. The patient is asked if there are any questions or concerns. We discuss the case, until all issues are addressed to the patient's satisfaction. Patient understands and is agreeable to the plan.     Amount and/or Complexity of Data Reviewed  External Data Reviewed: notes.  Labs: ordered. Decision-making details documented in ED Course.  Radiology: ordered. Decision-making details documented in ED Course.    Risk  Prescription drug management.               ED Course as of 24 1615   Tue Mar 26, 2024   1236 hCG Qualitative, Urine: Negative [CC]   1333 X-Ray Foot Complete Left  Moderate soft tissue swelling over the medial ankle, without evidence of a displaced fracture or dislocation. [CC]      ED Course User Index  [CC] Trish Meeks PA-C                           Clinical Impression:  Final diagnoses:  [M79.673] Heel pain  [M25.472] Ankle swelling, left (Primary)          ED Disposition Condition    Discharge Stable          ED Prescriptions       Medication Sig Dispense Start Date End Date Auth. Provider    diclofenac sodium (VOLTAREN) 1 % Gel Apply 2 g topically 2 (two) times daily. 20 g 3/26/2024 -- Trish Meeks PA-C    methylPREDNISolone (MEDROL DOSEPACK) 4 mg tablet use as directed 21 each 3/26/2024 2024 Trish Meeks PA-C    indomethacin (INDOCIN) 50 MG capsule () Take 1 capsule (50 mg total) by mouth 3 (three) times daily. for 7 days 21 capsule 3/26/2024 2024 Trish Meeks PA-C          Follow-up Information       Follow up With Specialties Details Why Contact Info    [facility] ED Emergency Medicine Go to  For new or worsening symptoms [address] [facility] [address] [phone]             Constantino  CHANCE Chambers  04/08/24 1377

## 2024-09-08 ENCOUNTER — HOSPITAL ENCOUNTER (EMERGENCY)
Facility: HOSPITAL | Age: 39
Discharge: HOME | End: 2024-09-08
Attending: EMERGENCY MEDICINE
Payer: MEDICAID

## 2024-09-08 VITALS
WEIGHT: 230 LBS | BODY MASS INDEX: 38.87 KG/M2 | SYSTOLIC BLOOD PRESSURE: 123 MMHG | DIASTOLIC BLOOD PRESSURE: 82 MMHG | RESPIRATION RATE: 18 BRPM | TEMPERATURE: 99 F | HEART RATE: 68 BPM | OXYGEN SATURATION: 100 %

## 2024-09-08 DIAGNOSIS — J02.9 VIRAL PHARYNGITIS: Primary | ICD-10-CM

## 2024-09-08 DIAGNOSIS — R10.9 ABDOMINAL PAIN: ICD-10-CM

## 2024-09-08 LAB
B-HCG UR QL: NEGATIVE
BILIRUB UR QL STRIP: NEGATIVE
CLARITY UR: CLEAR
COLOR UR: YELLOW
CTP QC/QA: YES
GLUCOSE UR QL STRIP: NEGATIVE
HGB UR QL STRIP: NEGATIVE
KETONES UR QL STRIP: NEGATIVE
LEUKOCYTE ESTERASE UR QL STRIP: NEGATIVE
MOLECULAR STREP A: NEGATIVE
NITRITE UR QL STRIP: NEGATIVE
PH UR STRIP: 6 [PH] (ref 5–8)
PROT UR QL STRIP: NEGATIVE
SARS-COV-2 RDRP RESP QL NAA+PROBE: NEGATIVE
SP GR UR STRIP: 1.02 (ref 1–1.03)
URN SPEC COLLECT METH UR: NORMAL
UROBILINOGEN UR STRIP-ACNC: NEGATIVE EU/DL

## 2024-09-08 PROCEDURE — 25000003 PHARM REV CODE 250

## 2024-09-08 PROCEDURE — 99284 EMERGENCY DEPT VISIT MOD MDM: CPT | Mod: 25

## 2024-09-08 PROCEDURE — 25000003 PHARM REV CODE 250: Performed by: PHYSICIAN ASSISTANT

## 2024-09-08 PROCEDURE — 87070 CULTURE OTHR SPECIMN AEROBIC: CPT

## 2024-09-08 PROCEDURE — 96372 THER/PROPH/DIAG INJ SC/IM: CPT

## 2024-09-08 PROCEDURE — 81025 URINE PREGNANCY TEST: CPT | Performed by: PHYSICIAN ASSISTANT

## 2024-09-08 PROCEDURE — 81003 URINALYSIS AUTO W/O SCOPE: CPT | Performed by: PHYSICIAN ASSISTANT

## 2024-09-08 PROCEDURE — 87651 STREP A DNA AMP PROBE: CPT

## 2024-09-08 PROCEDURE — 63600175 PHARM REV CODE 636 W HCPCS

## 2024-09-08 PROCEDURE — 87635 SARS-COV-2 COVID-19 AMP PRB: CPT | Performed by: EMERGENCY MEDICINE

## 2024-09-08 RX ORDER — ACETAMINOPHEN 500 MG
1000 TABLET ORAL
Status: COMPLETED | OUTPATIENT
Start: 2024-09-08 | End: 2024-09-08

## 2024-09-08 RX ORDER — DICYCLOMINE HYDROCHLORIDE 10 MG/1
20 CAPSULE ORAL
Status: COMPLETED | OUTPATIENT
Start: 2024-09-08 | End: 2024-09-08

## 2024-09-08 RX ORDER — HYDROCODONE BITARTRATE AND ACETAMINOPHEN 5; 325 MG/1; MG/1
1 TABLET ORAL EVERY 6 HOURS PRN
Qty: 12 TABLET | Refills: 0 | Status: SHIPPED | OUTPATIENT
Start: 2024-09-08 | End: 2024-09-11

## 2024-09-08 RX ORDER — ONDANSETRON 4 MG/1
4 TABLET, ORALLY DISINTEGRATING ORAL
Status: COMPLETED | OUTPATIENT
Start: 2024-09-08 | End: 2024-09-08

## 2024-09-08 RX ORDER — KETOROLAC TROMETHAMINE 30 MG/ML
30 INJECTION, SOLUTION INTRAMUSCULAR; INTRAVENOUS
Status: COMPLETED | OUTPATIENT
Start: 2024-09-08 | End: 2024-09-08

## 2024-09-08 RX ORDER — ALUMINUM HYDROXIDE, MAGNESIUM HYDROXIDE, AND SIMETHICONE 1200; 120; 1200 MG/30ML; MG/30ML; MG/30ML
30 SUSPENSION ORAL
Status: COMPLETED | OUTPATIENT
Start: 2024-09-08 | End: 2024-09-08

## 2024-09-08 RX ORDER — ONDANSETRON 4 MG/1
4 TABLET, ORALLY DISINTEGRATING ORAL EVERY 8 HOURS PRN
Qty: 15 TABLET | Refills: 0 | Status: SHIPPED | OUTPATIENT
Start: 2024-09-08

## 2024-09-08 RX ORDER — DICYCLOMINE HYDROCHLORIDE 20 MG/1
20 TABLET ORAL 2 TIMES DAILY
Qty: 20 TABLET | Refills: 0 | Status: SHIPPED | OUTPATIENT
Start: 2024-09-08 | End: 2024-10-08

## 2024-09-08 RX ADMIN — ONDANSETRON 4 MG: 4 TABLET, ORALLY DISINTEGRATING ORAL at 07:09

## 2024-09-08 RX ADMIN — ACETAMINOPHEN 1000 MG: 500 TABLET ORAL at 07:09

## 2024-09-08 RX ADMIN — ALUMINUM HYDROXIDE, MAGNESIUM HYDROXIDE, AND SIMETHICONE 30 ML: 1200; 120; 1200 SUSPENSION ORAL at 07:09

## 2024-09-08 RX ADMIN — DICYCLOMINE HYDROCHLORIDE 20 MG: 10 CAPSULE ORAL at 07:09

## 2024-09-08 RX ADMIN — KETOROLAC TROMETHAMINE 30 MG: 30 INJECTION, SOLUTION INTRAMUSCULAR; INTRAVENOUS at 07:09

## 2024-09-08 NOTE — Clinical Note
"Talia Traceyginna Colmenares was seen and treated in our emergency department on 9/8/2024.  She may return to work on 09/10/2024.       If you have any questions or concerns, please don't hesitate to call.      Zion Carpenter PA-C"

## 2024-09-08 NOTE — Clinical Note
"Talia Traceyginna Colmenares was seen and treated in our emergency department on 9/8/2024.  She may return to work on 09/11/2024.       If you have any questions or concerns, please don't hesitate to call.      Zion Carpenter PA-C"

## 2024-09-09 NOTE — DISCHARGE INSTRUCTIONS

## 2024-09-09 NOTE — ED PROVIDER NOTES
Encounter Date: 2024    SCRIBE #1 NOTE: I, Sari Birmingham, am scribing for, and in the presence of,  Zion Carpenter PA-C. I have scribed the following portions of the note - Other sections scribed: HPI, ROS.       History     Chief Complaint   Patient presents with    Sore Throat    Abdominal Pain      started with sore throat & swollen tonsils. States hx tonsilitis   while having a bm today had sharp pain to left side of abdomen. +BM but pain has not resolved     Talia Colmenares is a 38 y.o. female, with past medical history of hypertension and gastroesophageal reflux disease, who presents to the emergency department complaining of sore throat with associated tonsillar swelling x 2 days. Patient also reports an associated productive cough and nasal congestion. She denies any difficulty swallowing or other associated symptoms. 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 Patient reports history of similar symptoms in the past but states she has not been evaluated by ENT specialist.     Patient also reports constant, sharp, 7/10, left-sided abdominal pain that began this morning. Patient reports pain started while having a bowel movement, becoming a 10/10. Patient did not attempt treatment prior to arrival. Patient reports history of GERD and takes daily medication to treat it. She reports past surgical history of three  sections. Otherwise denies history of appendectomy, cholecystectomy, or other abdominal surgeries.     The history is provided by the patient. No  was used.     Review of patient's allergies indicates:   Allergen Reactions    Pcn [penicillins] Hives and Swelling     Past Medical History:   Diagnosis Date    GERD (gastroesophageal reflux disease)     History of bronchitis     as a child     History of pre-eclampsia in prior pregnancy, currently pregnant     history with previous two     Hypertension     high bp off and on with this pregnancy     Rectal bleeding      Past Surgical History:   Procedure Laterality Date     SECTION      x2    TUBAL LIGATION       Family History   Problem Relation Name Age of Onset    Asthma Mother      Hypertension Mother      Arthritis Father      Diabetes Father      Hyperlipidemia Father      Hypertension Father      Cancer Maternal Grandfather      Diabetes Maternal Grandfather      Heart disease Maternal Grandfather      Cancer Paternal Grandmother      Diabetes Paternal Grandmother       Social History     Tobacco Use    Smoking status: Every Day     Current packs/day: 0.20     Types: Cigars, Cigarettes    Smokeless tobacco: Former     Quit date: 10/30/2013   Substance Use Topics    Alcohol use: Yes     Alcohol/week: 0.0 standard drinks of alcohol    Drug use: No     Review of Systems   Constitutional:  Negative for chills and fever.   HENT:  Positive for congestion (nasal) and sore throat. Negative for facial swelling and trouble swallowing.         (+) Tonsillar swelling   Eyes:  Negative for visual disturbance.   Respiratory:  Positive for cough (productive). Negative for shortness of breath.    Cardiovascular:  Negative for chest pain and palpitations.   Gastrointestinal:  Positive for abdominal pain (left sided). Negative for constipation, nausea and vomiting.   Genitourinary:  Negative for dysuria and hematuria.   Musculoskeletal:  Negative for back pain.   Skin:  Negative for rash.   Neurological:  Negative for weakness and headaches.   Hematological:  Does not bruise/bleed easily.   Psychiatric/Behavioral: Negative.         Physical Exam     Initial Vitals [24 1731]   BP Pulse Resp Temp SpO2   131/88 108 18 (!) 100.6 °F (38.1 °C) 98 %      MAP       --         Physical Exam    Nursing note and vitals reviewed.  Constitutional: Vital signs are normal. She appears well-developed and well-nourished. She is cooperative. She does not appear ill. No distress.   Well-appearing.  No acute distress.    HENT:   Head: Normocephalic and atraumatic.   Right Ear: Hearing and external ear normal.   Left Ear: Hearing and external ear normal.   Nose: Nose normal.   Bilateral tonsils 2+, no erythema or exudates.  Patient was able to swallow and is managing secretions appropriately.  Phonating and protecting the airway spontaneously.  No middle ear effusion.   Eyes: Conjunctivae and EOM are normal.   Neck: Phonation normal.   Normal range of motion.  Cardiovascular:  Normal rate and regular rhythm.           No murmur heard.  Heart rate 96 beats per minute on my exam.  No murmur or friction rub.   Pulmonary/Chest: Effort normal. No respiratory distress.   Respirations even and unlabored.  No adventitious sounds breathing.   Abdominal: Abdomen is soft. Bowel sounds are normal. She exhibits no distension. There is no abdominal tenderness.   Abdomen is soft.  Mild tenderness to palpation in the left upper and left lower quadrants.  No rebound or guarding.  Negative Rovsing sign.  No tenderness over McBurney's point.  Negative Pickens's sign.   Musculoskeletal:      Cervical back: Normal range of motion.     Neurological: She is alert and oriented to person, place, and time. GCS eye subscore is 4. GCS verbal subscore is 5. GCS motor subscore is 6.   Skin: Skin is warm. Capillary refill takes less than 2 seconds.         ED Course   Procedures  Labs Reviewed   CULTURE, RESPIRATORY  - THROAT       Result Value    RESPIRATORY CULTURE - THROAT Further report to follow     URINALYSIS, REFLEX TO URINE CULTURE    Specimen UA Urine, Clean Catch      Color, UA Yellow      Appearance, UA Clear      pH, UA 6.0      Specific Gravity, UA 1.025      Protein, UA Negative      Glucose, UA Negative      Ketones, UA Negative      Bilirubin (UA) Negative      Occult Blood UA Negative      Nitrite, UA Negative      Urobilinogen, UA Negative      Leukocytes, UA Negative      Narrative:     Specimen Source->Urine   POCT STREP A MOLECULAR     Molecular Strep A, POC Negative       Acceptable Yes     SARS-COV-2 RDRP GENE    POC Rapid COVID Negative       Acceptable Yes     POCT URINE PREGNANCY    POC Preg Test, Ur Negative       Acceptable Yes            Imaging Results              X-Ray Abdomen Flat And Erect (Final result)  Result time 09/08/24 19:55:20      Final result by Deana Soriano MD (09/08/24 19:55:20)                   Impression:      Nonobstructive bowel gas pattern.      Electronically signed by: Deana Soriano MD  Date:    09/08/2024  Time:    19:55               Narrative:    EXAMINATION:  XR ABDOMEN FLAT AND ERECT    CLINICAL HISTORY:  Unspecified abdominal pain    TECHNIQUE:  Flat and erect AP views of the abdomen were performed.    COMPARISON:  None    FINDINGS:  Nonspecific bowel gas pattern.  No evidence to suggest obstruction.  No free air identified.  Scattered stool is seen in the colon.  Partially visualized lung bases are clear.                                       Medications   acetaminophen tablet 1,000 mg (1,000 mg Oral Given 9/8/24 1925)   ketorolac injection 30 mg (30 mg Intramuscular Given 9/8/24 1949)   dicyclomine capsule 20 mg (20 mg Oral Given 9/8/24 1950)   ondansetron disintegrating tablet 4 mg (4 mg Oral Given 9/8/24 1948)   aluminum-magnesium hydroxide-simethicone 200-200-20 mg/5 mL suspension 30 mL (30 mLs Oral Given 9/8/24 1951)     Medical Decision Making  38-year-old female presenting to the emergency department with multiple complaints.  URI symptoms, specifically sore throat and pain with swallowing that started 2 days ago.  Denies fever, chills, decreased appetite, chest pain, shortness of breath, or dyspnea.  Secondary complaint is left-sided abdominal pain that started while she was having bowel movement this morning.  No constipation, diarrhea, or blood in stool.  No nausea or vomiting.  On exam, she was well-appearing and in no acute distress.  She was  mildly febrile and tachycardic.  Bilateral tonsils are swollen, no erythema or exudates.  Airway patent, able to tolerate secretions.  Mild tenderness to palpation in the left side of the abdomen diffusely.  No focal tenderness.  No rebound or guarding.    Differential diagnosis is broad and includes but is not limited to strep pharyngitis, viral pharyngitis, COVID, flu, retropharyngeal abscess, peritonsillar abscess, gastroenteritis, appendicitis, pancreatitis, cholecystitis, diverticulitis, peritonitis, small-bowel obstruction, epiploic appendagitis, constipation, urinary tract infection including cystitis or pyelonephritis, ovarian torsion, ruptured ovarian cyst, pregnancy, ectopic pregnancy, tubo-ovarian abscess, dysmenorrhea, pelvic inflammatory disease, sexually transmitted infection, vaginitis, cervicitis, musculoskeletal pain.    Initial concern was for tonsillitis/pharyngitis.  COVID and strep were negative.  Throat culture is pending.  Patient gets recurrent pharyngitis.  This presentation is similar with swollen tonsils but no erythema or exudates.  Patient was also reporting abdominal pain that started while she was having a bowel movement this morning.  Urinalysis negative for any signs of infection or blood, low suspicion for pyelonephritis or urolithiasis.  Abdominal x-ray with nonobstructive bowel gas pattern.  Attempted treatment in the emergency department with Toradol, Bentyl, Maalox, Zofran, Tylenol.  Patient reported some improvement in her sore throat, denied any improvement in her abdominal pain.  At this point, discussed further imaging versus trial of medications to manage symptoms at home.  Patient prefers to trial medications at home.  Discussed that imaging may help to further elucidate the cause of her abdominal pain but she was still prefer to go home at this point.  I believe this is reasonable.  Relatively benign abdominal exam, no focal tenderness, no rebound or guarding.  Low  suspicion for appendicitis, diverticulitis, perforation, or other surgical emergency.    Return precautions were discussed, all patient questions were answered, and the patient was agreeable to the plan of care.  She was discharged home in stable condition and will follow up with her primary care provider or return to the emergency department if her symptoms worsen or do not improve.     Amount and/or Complexity of Data Reviewed  Labs: ordered. Decision-making details documented in ED Course.  Radiology: ordered. Decision-making details documented in ED Course.    Risk  OTC drugs.  Prescription drug management.  Diagnosis or treatment significantly limited by social determinants of health.            Scribe Attestation:   Scribe #1: I performed the above scribed service and the documentation accurately describes the services I performed. I attest to the accuracy of the note.                             I, Zion Carpenter PA-C, personally performed the services described in this documentation. All medical record entries made by the scribe were at my direction and in my presence. I have reviewed the chart and agree that the record reflects my personal performance and is accurate and complete.    Clinical Impression:  Final diagnoses:  [R10.9] Abdominal pain - Left upper and left lower quadrants  [J02.9] Viral pharyngitis (Primary)          ED Disposition Condition    Discharge Stable          ED Prescriptions       Medication Sig Dispense Start Date End Date Auth. Provider    dicyclomine (BENTYL) 20 mg tablet Take 1 tablet (20 mg total) by mouth 2 (two) times daily. 20 tablet 9/8/2024 10/8/2024 Zion Carpenter PA-C    HYDROcodone-acetaminophen (NORCO) 5-325 mg per tablet Take 1 tablet by mouth every 6 (six) hours as needed (Pain not relieved by other methods). 12 tablet 9/8/2024 9/11/2024 Zion Carpenter PA-C    ondansetron (ZOFRAN-ODT) 4 MG TbDL Take 1 tablet (4 mg total) by mouth every 8 (eight) hours as  needed. 15 tablet 9/8/2024 -- Zion Carpenter, CHANCE          Follow-up Information       Follow up With Specialties Details Why Contact Info    [facility] -  Schedule an appointment as soon as possible for a visit  As needed, If symptoms worsen [address]  [place] [address]  [phone]               Zion Carpenter, CHANCE  09/09/24 2907

## 2024-09-10 LAB — BACTERIA THROAT CULT: NORMAL

## 2024-11-20 ENCOUNTER — OFFICE VISIT (OUTPATIENT)
Dept: OTOLARYNGOLOGY | Facility: CLINIC | Age: 39
End: 2024-11-20
Payer: MEDICAID

## 2024-11-20 ENCOUNTER — TELEPHONE (OUTPATIENT)
Dept: OTOLARYNGOLOGY | Facility: CLINIC | Age: 39
End: 2024-11-20
Payer: MEDICAID

## 2024-11-20 ENCOUNTER — PATIENT MESSAGE (OUTPATIENT)
Dept: OTOLARYNGOLOGY | Facility: CLINIC | Age: 39
End: 2024-11-20
Payer: MEDICAID

## 2024-11-20 VITALS
RESPIRATION RATE: 16 BRPM | WEIGHT: 234.69 LBS | HEIGHT: 65 IN | BODY MASS INDEX: 39.1 KG/M2 | SYSTOLIC BLOOD PRESSURE: 122 MMHG | HEART RATE: 92 BPM | DIASTOLIC BLOOD PRESSURE: 78 MMHG

## 2024-11-20 DIAGNOSIS — J03.91 RECURRENT TONSILLITIS: Primary | ICD-10-CM

## 2024-11-20 DIAGNOSIS — J35.8 TONSILLOLITH: ICD-10-CM

## 2024-11-20 PROCEDURE — 99203 OFFICE O/P NEW LOW 30 MIN: CPT | Mod: S$GLB,,, | Performed by: STUDENT IN AN ORGANIZED HEALTH CARE EDUCATION/TRAINING PROGRAM

## 2024-11-20 PROCEDURE — 3008F BODY MASS INDEX DOCD: CPT | Mod: CPTII,S$GLB,, | Performed by: STUDENT IN AN ORGANIZED HEALTH CARE EDUCATION/TRAINING PROGRAM

## 2024-11-20 PROCEDURE — 1159F MED LIST DOCD IN RCRD: CPT | Mod: CPTII,S$GLB,, | Performed by: STUDENT IN AN ORGANIZED HEALTH CARE EDUCATION/TRAINING PROGRAM

## 2024-11-20 PROCEDURE — 3078F DIAST BP <80 MM HG: CPT | Mod: CPTII,S$GLB,, | Performed by: STUDENT IN AN ORGANIZED HEALTH CARE EDUCATION/TRAINING PROGRAM

## 2024-11-20 PROCEDURE — 3074F SYST BP LT 130 MM HG: CPT | Mod: CPTII,S$GLB,, | Performed by: STUDENT IN AN ORGANIZED HEALTH CARE EDUCATION/TRAINING PROGRAM

## 2024-11-20 PROCEDURE — 1160F RVW MEDS BY RX/DR IN RCRD: CPT | Mod: CPTII,S$GLB,, | Performed by: STUDENT IN AN ORGANIZED HEALTH CARE EDUCATION/TRAINING PROGRAM

## 2024-11-20 NOTE — TELEPHONE ENCOUNTER
Called pt at 4:51pm on 11/20 to tell her arrival time of 5:00am (11/26) and NPO/Clear liquid guidelines. Pt states that she was told she could eat before surgery by someone else. I told the patient that she would need to stop eating after 9:00pm on 11/25. I informed her that she could have clear liquids up until 2 hours before scheduled arrival time. I provided examples of clear liquids. Pt verbalized understanding. Instructions were messaged to patient in the portal.

## 2024-11-20 NOTE — PROGRESS NOTES
Ear, Nose, & Throat  Otolaryngology - Head & Neck Surgery      Subjective:     Chief Complaint:   Chief Complaint   Patient presents with    Sore Throat     Swelling and irritation since a child - issues swallowing, pt describes yellow rocks coming up when coughing    Cough     SOB from Bronchitis       Talia Colmenares is a 39 y.o. female who was referred to me by Zion Carpenter in consultation for tonsil stones.    She states that her tonsils are constantly enlarged and tender.  She gets frequent tonsil stones which she self expresses.  These are bothersome to her.  She has had 2 ED visits in the past year for pharyngitis.    She reports greater than 10 infections over the past year. She has not been any treated with antibiotics in the past.  Last infection roughly 2 months ago.     She is not on anticoagulation.     Denies any history of halitosis, snoring, and obstructive sleep apnea    Past Medical History  Active Ambulatory Problems     Diagnosis Date Noted    GERD (gastroesophageal reflux disease) 2013    History of pre-eclampsia in prior pregnancy, currently pregnant 2013    H/O  section 2013    Trichomonas - treated with negative OLGA 2013    Dehydration 2013    Supervision of other normal pregnancy 2013    Abdominal pain in pregnancy 2014    Pregnancy with one fetus 2014    wants btl 2014     Resolved Ambulatory Problems     Diagnosis Date Noted    No Resolved Ambulatory Problems     Past Medical History:   Diagnosis Date    History of bronchitis     Hypertension     Rectal bleeding        Past Surgical History  She has a past surgical history that includes  section and Tubal ligation.    Past Surgical History:   Procedure Laterality Date     SECTION      x2    TUBAL LIGATION          Family History  Her family history includes Arthritis in her father; Asthma in her mother; Cancer in her maternal grandfather and paternal  "grandmother; Diabetes in her father, maternal grandfather, and paternal grandmother; Heart disease in her maternal grandfather; Hyperlipidemia in her father; Hypertension in her father and mother.    Social History  She reports that she has quit smoking. Her smoking use included cigars and cigarettes. She started smoking about 3 months ago. She quit smokeless tobacco use about 11 years ago. She reports current alcohol use. She reports that she does not use drugs.    Allergies  She is allergic to pcn [penicillins].    Medications  She has a current medication list which includes the following prescription(s): albuterol, benzocaine-menthol, cetirizine, cyclobenzaprine, diclofenac sodium, ibuprofen, ondansetron, pantoprazole, and tramadol, and the following Facility-Administered Medications: acetaminophen, albuterol, diphenhydramine, epinephrine, methylprednisolone sodium succinate, sodium chloride 0.9% 500 mL flush bag, and sodium chloride 0.9%.    ROS:  Pertinent positive and negative review of systems as noted in HPI.     Objective:     /78 (BP Location: Right arm, Patient Position: Sitting)   Pulse 92   Resp 16   Ht 5' 4.5" (1.638 m)   Wt 106.4 kg (234 lb 10.9 oz)   BMI 39.66 kg/m²    Physical Exam  Constitutional: Well appearing, communicating. No acute distress  Voice: Euphonic  Head/Face: House Brackmann I Bilaterally.  Oropharynx:    Tonsils symmetric, 3+, cryptic   Cho Tongue Position: 3 (partial obstruction soft palate)  Neck/Lymphatic:    No masses or lesions   Trachea midline   Neck Circumference  Neuro/Psychiatric:     Affect: Appropriate   Eyes: EOMI intact  Respiratory:   No increased WOB, no stridor     Data Review:   LABS  Hemoglobin (g/dL)   Date Value   12/04/2016 11.6 (L)     WBC (K/uL)   Date Value   12/04/2016 8.84     Platelets (K/uL)   Date Value   12/04/2016 445 (H)     Creatinine (mg/dL)   Date Value   12/04/2016 0.9     Calcium (mg/dL)   Date Value   12/04/2016 9.3     INR (no " units)   Date Value   10/19/2012 1.0    (HGB:1,HCT:1,WBC:1,PLT:1,NA:1,K:1,CREATININE:1,BUN:1,GLU:1,POCTGLUCOSE:1,CALCIUM:1, A1C:1)@,   INR (no units)   Date Value   10/19/2012 1.0     aPTT (sec)   Date Value   10/19/2012 27.7     Platelets (K/uL)   Date Value   12/04/2016 445 (H)       I have independently reviewed the lab results shown above. Findings significant for the conditions being treated include:  No recent labs    IMAGING    No pertinent imaging available    AUDIO    not performed    Procedures:       Assessment:     1. Recurrent tonsillitis    2. Tonsillolith        Plan:     After speaking to and examining the patient, I believe they meets criteria for tonsillectomy.  We discussed that the patient has 2 fundamental options: Observation with continued and intermittent antibiotics versus tonsillectomy.  I described tonsillectomy as an outpatient procedure in which the tonsils are removed through the mouth while under general anesthesia. I discussed that the patient would likely need 1-2 weeks of light activity to recover and may need to miss work/school during this time. I also discussed that most patient require narcotic pain medication and that would not be safe to drive while operating a vehicle or heavy machinery. I then discussed the risks, benefits, indications, and alternatives to tonsillectomy.  The risks were described to include, but not be limited to, infection, bleeding, scarring, failure to eliminate sore throats, and the need for additional procedures.  There is data that suggests that sore throats may occur less frequently or be less severe, although no warranties or guarantees were made or implied.  There is an increased risk of bleeding within the first 6 hours of surgery which returns between 5 and 14 days after surgery when the scabs resolve.  The patient was encouraged to drink lots of liquids, eat soft foods, and avoid sharp edged foods such as chips.  Time was allowed for questions,  and all questions were answered to the patient's apparent satisfaction. The patient showed good understanding of their condition and wishes to proceed with surgery.           Voice recognition software was used in the creation of this note/communication and any sound-alike errors which may have occurred from its use should be taken in context when interpreting. If such errors prevent a clear understanding of the note/communication, please contact the office for clarification.     No orders of the defined types were placed in this encounter.

## 2024-11-20 NOTE — H&P (VIEW-ONLY)
Ear, Nose, & Throat  Otolaryngology - Head & Neck Surgery      Subjective:     Chief Complaint:   Chief Complaint   Patient presents with    Sore Throat     Swelling and irritation since a child - issues swallowing, pt describes yellow rocks coming up when coughing    Cough     SOB from Bronchitis       Talia Colmenares is a 39 y.o. female who was referred to me by Zion Carpenter in consultation for tonsil stones.    She states that her tonsils are constantly enlarged and tender.  She gets frequent tonsil stones which she self expresses.  These are bothersome to her.  She has had 2 ED visits in the past year for pharyngitis.    She reports greater than 10 infections over the past year. She has not been any treated with antibiotics in the past.  Last infection roughly 2 months ago.     She is not on anticoagulation.     Denies any history of halitosis, snoring, and obstructive sleep apnea    Past Medical History  Active Ambulatory Problems     Diagnosis Date Noted    GERD (gastroesophageal reflux disease) 2013    History of pre-eclampsia in prior pregnancy, currently pregnant 2013    H/O  section 2013    Trichomonas - treated with negative OLGA 2013    Dehydration 2013    Supervision of other normal pregnancy 2013    Abdominal pain in pregnancy 2014    Pregnancy with one fetus 2014    wants btl 2014     Resolved Ambulatory Problems     Diagnosis Date Noted    No Resolved Ambulatory Problems     Past Medical History:   Diagnosis Date    History of bronchitis     Hypertension     Rectal bleeding        Past Surgical History  She has a past surgical history that includes  section and Tubal ligation.    Past Surgical History:   Procedure Laterality Date     SECTION      x2    TUBAL LIGATION          Family History  Her family history includes Arthritis in her father; Asthma in her mother; Cancer in her maternal grandfather and paternal  "grandmother; Diabetes in her father, maternal grandfather, and paternal grandmother; Heart disease in her maternal grandfather; Hyperlipidemia in her father; Hypertension in her father and mother.    Social History  She reports that she has quit smoking. Her smoking use included cigars and cigarettes. She started smoking about 3 months ago. She quit smokeless tobacco use about 11 years ago. She reports current alcohol use. She reports that she does not use drugs.    Allergies  She is allergic to pcn [penicillins].    Medications  She has a current medication list which includes the following prescription(s): albuterol, benzocaine-menthol, cetirizine, cyclobenzaprine, diclofenac sodium, ibuprofen, ondansetron, pantoprazole, and tramadol, and the following Facility-Administered Medications: acetaminophen, albuterol, diphenhydramine, epinephrine, methylprednisolone sodium succinate, sodium chloride 0.9% 500 mL flush bag, and sodium chloride 0.9%.    ROS:  Pertinent positive and negative review of systems as noted in HPI.     Objective:     /78 (BP Location: Right arm, Patient Position: Sitting)   Pulse 92   Resp 16   Ht 5' 4.5" (1.638 m)   Wt 106.4 kg (234 lb 10.9 oz)   BMI 39.66 kg/m²    Physical Exam  Constitutional: Well appearing, communicating. No acute distress  Voice: Euphonic  Head/Face: House Brackmann I Bilaterally.  Oropharynx:    Tonsils symmetric, 3+, cryptic   Coh Tongue Position: 3 (partial obstruction soft palate)  Neck/Lymphatic:    No masses or lesions   Trachea midline   Neck Circumference  Neuro/Psychiatric:     Affect: Appropriate   Eyes: EOMI intact  Respiratory:   No increased WOB, no stridor     Data Review:   LABS  Hemoglobin (g/dL)   Date Value   12/04/2016 11.6 (L)     WBC (K/uL)   Date Value   12/04/2016 8.84     Platelets (K/uL)   Date Value   12/04/2016 445 (H)     Creatinine (mg/dL)   Date Value   12/04/2016 0.9     Calcium (mg/dL)   Date Value   12/04/2016 9.3     INR (no " units)   Date Value   10/19/2012 1.0    (HGB:1,HCT:1,WBC:1,PLT:1,NA:1,K:1,CREATININE:1,BUN:1,GLU:1,POCTGLUCOSE:1,CALCIUM:1, A1C:1)@,   INR (no units)   Date Value   10/19/2012 1.0     aPTT (sec)   Date Value   10/19/2012 27.7     Platelets (K/uL)   Date Value   12/04/2016 445 (H)       I have independently reviewed the lab results shown above. Findings significant for the conditions being treated include:  No recent labs    IMAGING    No pertinent imaging available    AUDIO    not performed    Procedures:       Assessment:     1. Recurrent tonsillitis    2. Tonsillolith        Plan:     After speaking to and examining the patient, I believe they meets criteria for tonsillectomy.  We discussed that the patient has 2 fundamental options: Observation with continued and intermittent antibiotics versus tonsillectomy.  I described tonsillectomy as an outpatient procedure in which the tonsils are removed through the mouth while under general anesthesia. I discussed that the patient would likely need 1-2 weeks of light activity to recover and may need to miss work/school during this time. I also discussed that most patient require narcotic pain medication and that would not be safe to drive while operating a vehicle or heavy machinery. I then discussed the risks, benefits, indications, and alternatives to tonsillectomy.  The risks were described to include, but not be limited to, infection, bleeding, scarring, failure to eliminate sore throats, and the need for additional procedures.  There is data that suggests that sore throats may occur less frequently or be less severe, although no warranties or guarantees were made or implied.  There is an increased risk of bleeding within the first 6 hours of surgery which returns between 5 and 14 days after surgery when the scabs resolve.  The patient was encouraged to drink lots of liquids, eat soft foods, and avoid sharp edged foods such as chips.  Time was allowed for questions,  and all questions were answered to the patient's apparent satisfaction. The patient showed good understanding of their condition and wishes to proceed with surgery.           Voice recognition software was used in the creation of this note/communication and any sound-alike errors which may have occurred from its use should be taken in context when interpreting. If such errors prevent a clear understanding of the note/communication, please contact the office for clarification.     No orders of the defined types were placed in this encounter.

## 2024-11-21 ENCOUNTER — ANESTHESIA EVENT (OUTPATIENT)
Dept: SURGERY | Facility: OTHER | Age: 39
End: 2024-11-21
Payer: MEDICAID

## 2024-11-21 ENCOUNTER — HOSPITAL ENCOUNTER (OUTPATIENT)
Dept: PREADMISSION TESTING | Facility: OTHER | Age: 39
Discharge: HOME | End: 2024-11-21
Attending: STUDENT IN AN ORGANIZED HEALTH CARE EDUCATION/TRAINING PROGRAM
Payer: MEDICAID

## 2024-11-21 VITALS
RESPIRATION RATE: 18 BRPM | SYSTOLIC BLOOD PRESSURE: 127 MMHG | DIASTOLIC BLOOD PRESSURE: 76 MMHG | TEMPERATURE: 98 F | HEART RATE: 99 BPM | WEIGHT: 234 LBS | OXYGEN SATURATION: 97 % | HEIGHT: 65 IN | BODY MASS INDEX: 38.99 KG/M2

## 2024-11-21 DIAGNOSIS — Z01.818 PREOP TESTING: Primary | ICD-10-CM

## 2024-11-21 LAB
BASOPHILS # BLD AUTO: 0.07 K/UL (ref 0–0.2)
BASOPHILS NFR BLD: 1 % (ref 0–1.9)
DIFFERENTIAL METHOD BLD: ABNORMAL
EOSINOPHIL # BLD AUTO: 0.1 K/UL (ref 0–0.5)
EOSINOPHIL NFR BLD: 1.2 % (ref 0–8)
ERYTHROCYTE [DISTWIDTH] IN BLOOD BY AUTOMATED COUNT: 14.3 % (ref 11.5–14.5)
HCT VFR BLD AUTO: 37.6 % (ref 37–48.5)
HGB BLD-MCNC: 12.4 G/DL (ref 12–16)
IMM GRANULOCYTES # BLD AUTO: 0.01 K/UL (ref 0–0.04)
IMM GRANULOCYTES NFR BLD AUTO: 0.1 % (ref 0–0.5)
LYMPHOCYTES # BLD AUTO: 1.9 K/UL (ref 1–4.8)
LYMPHOCYTES NFR BLD: 28 % (ref 18–48)
MCH RBC QN AUTO: 29.7 PG (ref 27–31)
MCHC RBC AUTO-ENTMCNC: 33 G/DL (ref 32–36)
MCV RBC AUTO: 90 FL (ref 82–98)
MONOCYTES # BLD AUTO: 0.5 K/UL (ref 0.3–1)
MONOCYTES NFR BLD: 7.5 % (ref 4–15)
NEUTROPHILS # BLD AUTO: 4.2 K/UL (ref 1.8–7.7)
NEUTROPHILS NFR BLD: 62.2 % (ref 38–73)
NRBC BLD-RTO: 0 /100 WBC
PLATELET # BLD AUTO: 441 K/UL (ref 150–450)
PMV BLD AUTO: 8.8 FL (ref 9.2–12.9)
RBC # BLD AUTO: 4.17 M/UL (ref 4–5.4)
WBC # BLD AUTO: 6.68 K/UL (ref 3.9–12.7)

## 2024-11-21 PROCEDURE — 85025 COMPLETE CBC W/AUTO DIFF WBC: CPT | Performed by: ANESTHESIOLOGY

## 2024-11-21 RX ORDER — ACETAMINOPHEN 500 MG
1000 TABLET ORAL
OUTPATIENT
Start: 2024-11-21 | End: 2024-11-21

## 2024-11-21 RX ORDER — PREGABALIN 75 MG/1
75 CAPSULE ORAL ONCE
OUTPATIENT
Start: 2024-11-21 | End: 2024-11-21

## 2024-11-21 RX ORDER — LIDOCAINE HYDROCHLORIDE 10 MG/ML
0.5 INJECTION, SOLUTION EPIDURAL; INFILTRATION; INTRACAUDAL; PERINEURAL ONCE
OUTPATIENT
Start: 2024-11-21 | End: 2024-11-21

## 2024-11-21 RX ORDER — SODIUM CHLORIDE, SODIUM LACTATE, POTASSIUM CHLORIDE, CALCIUM CHLORIDE 600; 310; 30; 20 MG/100ML; MG/100ML; MG/100ML; MG/100ML
INJECTION, SOLUTION INTRAVENOUS CONTINUOUS
OUTPATIENT
Start: 2024-11-21

## 2024-11-21 RX ORDER — OMEPRAZOLE 20 MG/1
20 CAPSULE, DELAYED RELEASE ORAL DAILY
COMMUNITY

## 2024-11-21 NOTE — DISCHARGE INSTRUCTIONS
Information to Prepare you for your Surgery    PRE-ADMIT TESTING -  [phone]    [address]  [facility]          Your surgery has been scheduled at [facility]. We are pleased to have the opportunity to serve you. For Further Information please call [phone].    On the day of surgery please report to the Information Desk on the 1st floor.    CONTACT YOUR PHYSICIAN'S OFFICE THE DAY PRIOR TO YOUR SURGERY TO OBTAIN YOUR ARRIVAL TIME.     The evening before surgery do not eat anything after 9 p.m. ( this includes hard candy, chewing gum and mints).  You may only have GATORADE, POWERADE AND WATER  from 9 p.m. until you leave your home.   DO NOT DRINK ANY LIQUIDS ON THE WAY TO THE HOSPITAL.      Why does your anesthesiologist allow you to drink Gatorade/Powerade before surgery?  Gatorade/Powerade helps to increase your comfort before surgery and to decrease your nausea after surgery. The carbohydrates in Gatorade/Powerade help reduce your body's stress response to surgery.  If you are a diabetic-drink only water prior to surgery.    Outpatient Surgery- May allow 2 adult (18 and older) Support Persons (1 being the designated ) for all surgical/procedural patients. A breastfeeding mother will be allowed her infant and 2 adult Support Persons. No one under the age of 18 will be allowed in the building. No swapping out of visitors in the [facility].      SPECIAL MEDICATION INSTRUCTIONS: TAKE medications checked off by the Anesthesiologist on your Medication List.    Angiogram Patients: Take medications as instructed by your physician, including aspirin.     Surgery Patients:    If you take ASPIRIN - Your PHYSICIAN/SURGEON will need to inform you IF/OR when you need to stop taking aspirin prior to your surgery.     The week prior to surgery do not ot take any medications containing IBUPROFEN or NSAIDS ( Advil, Motrin, Goodys, BC, Aleve, Naproxen etc)  If you are not sure if you should take a medicine please call your surgeon's office.  Ok to take Tylenol    Do Not Wear any make-up (especially eye make-up) to surgery. Please remove any false eyelashes or eyelash extensions. If you arrive the day of surgery with makeup/eyelashes on you will be required to remove prior to surgery. (There is a risk of corneal abrasions if eye makeup/eyelash extensions are not removed)      Leave all valuables at home.   Do Not wear any jewelry or watches, including any metal in body piercings. Jewelry must be removed prior to coming to the hospital.  There is a possibility that rings that are unable to be removed may be cut off if they are on the surgical extremity.    Please remove all hair extensions, wigs, clips and any other metal accessories/ ornaments from your hair.  These items may pose a flammable/fire risk in Surgery and must be removed.    Do not shave your surgical area at least 5 days prior to your surgery. The surgical prep will be performed at the hospital according to Infection Control regulations.    Contact Lens must be removed before surgery. Either do not wear the contact lens or bring a case and solution for storage.  Please bring a container for eyeglasses or dentures as required.  Bring any paperwork your physician has provided, such as consent forms,  history and physicals, doctor's orders, etc.   Bring comfortable clothes that are loose fitting to wear upon discharge. Take into consideration the type of surgery being performed.  Maintain your diet as advised per your physician the day prior to surgery.      Adequate rest the night before surgery is advised.   Park in the Parking lot behind the hospital or in the [facility] across the street from the parking lot. Parking is complimentary.  If you will be discharged the same day as your procedure, please arrange for a responsible adult to drive you home or to accompany you if traveling by taxi.    YOU WILL NOT BE PERMITTED TO DRIVE OR TO LEAVE THE HOSPITAL ALONE AFTER SURGERY.   If you are being discharged the same day, it is strongly recommended that you arrange for someone to remain with you for the first 24 hrs following your surgery.    The Surgeon will speak to your family/visitor after your surgery regarding the outcome of your surgery and post op care.  The Surgeon may speak to you after your surgery, but there is a possibility you may not remember the details.  Please check with your family members regarding the conversation with the Surgeon.    We strongly recommend whoever is bringing you home be present for discharge instructions.  This will ensure a thorough understanding for your post op home care.    If the patient has fever, cough, or signs/symptoms of Flu or Covid please do not come in for your surgery. Contact your surgeon and your primary care physician for further instructions.           Thank you for your cooperation.  The Staff of [facility].            Bathing Instructions with Hibiclens    Shower the evening before and morning of your procedure with Chlorhexidine (Hibiclens)  do not use Chlorhexidine on your face or genitals. Do not get in your eyes.  Wash your face with water and your regular face wash/soap  Use your regular shampoo  Apply Chlorhexidine (Hibiclens) directly on your skin or on a wet washcloth and wash gently. When showering: Move away from the shower stream when applying Chlorhexidine (Hibiclens) to avoid rinsing off too soon.  Rinse thoroughly with warm water  Do not dilute Chlorhexidine (Hibiclens)   Dry off as usual, do not use any deodorant, powder, body lotions, perfume, after shave or cologne.

## 2024-11-21 NOTE — ANESTHESIA PREPROCEDURE EVALUATION
11/21/2024  Talia Colmenares is a 39 y.o., female.      Pre-op Assessment    I have reviewed the Patient Summary Reports.     I have reviewed the Nursing Notes. I have reviewed the NPO Status.   I have reviewed the Medications.     Review of Systems  Anesthesia Hx:  No problems with previous Anesthesia             Denies Family Hx of Anesthesia complications.    Denies Personal Hx of Anesthesia complications.                    Social:  Former Smoker       Hematology/Oncology:  Hematology Normal   Oncology Normal                                   EENT/Dental:  EENT/Dental Normal           Cardiovascular:  Cardiovascular Normal                                              Pulmonary:  Pulmonary Normal        Snores; suspect FIDENCIO               Renal/:  Renal/ Normal                 Hepatic/GI:     GERD                Musculoskeletal:  Musculoskeletal Normal                Neurological:  Neurology Normal                                      Endocrine:  Endocrine Normal          Morbid Obesity / BMI > 40  Dermatological:  Skin Normal    Psych:  Psychiatric Normal                  Physical Exam  General: Well nourished and Alert    Airway:  Mallampati: II   Mouth Opening: Normal  Tongue: Normal    Dental:  Intact      Anesthesia Plan  Type of Anesthesia, risks & benefits discussed:    Anesthesia Type: Gen ETT  Intra-op Monitoring Plan: Standard ASA Monitors  Post Op Pain Control Plan: multimodal analgesia  Induction:  IV  Airway Plan: Video  Informed Consent: Informed consent signed with the Patient and all parties understand the risks and agree with anesthesia plan.  All questions answered.   ASA Score: 3  Anesthesia Plan Notes: CBC    Ready For Surgery From Anesthesia Perspective.     .

## 2024-11-25 PROBLEM — J35.8 TONSILLOLITH: Status: ACTIVE | Noted: 2024-11-25

## 2024-11-25 RX ORDER — DEXAMETHASONE SODIUM PHOSPHATE 4 MG/ML
12 INJECTION, SOLUTION INTRA-ARTICULAR; INTRALESIONAL; INTRAMUSCULAR; INTRAVENOUS; SOFT TISSUE
OUTPATIENT
Start: 2024-11-25

## 2024-11-26 ENCOUNTER — HOSPITAL ENCOUNTER (OUTPATIENT)
Facility: OTHER | Age: 39
Discharge: HOME | End: 2024-11-26
Attending: STUDENT IN AN ORGANIZED HEALTH CARE EDUCATION/TRAINING PROGRAM | Admitting: STUDENT IN AN ORGANIZED HEALTH CARE EDUCATION/TRAINING PROGRAM
Payer: MEDICAID

## 2024-11-26 ENCOUNTER — ANESTHESIA (OUTPATIENT)
Dept: SURGERY | Facility: OTHER | Age: 39
End: 2024-11-26
Payer: MEDICAID

## 2024-11-26 DIAGNOSIS — J35.1 TONSILLAR HYPERTROPHY: ICD-10-CM

## 2024-11-26 DIAGNOSIS — J35.8 TONSILLOLITH: Primary | ICD-10-CM

## 2024-11-26 LAB
B-HCG UR QL: NEGATIVE
CTP QC/QA: YES

## 2024-11-26 PROCEDURE — 71000039 HC RECOVERY, EACH ADD'L HOUR: Performed by: STUDENT IN AN ORGANIZED HEALTH CARE EDUCATION/TRAINING PROGRAM

## 2024-11-26 PROCEDURE — 42826 REMOVAL OF TONSILS: CPT | Mod: ,,, | Performed by: STUDENT IN AN ORGANIZED HEALTH CARE EDUCATION/TRAINING PROGRAM

## 2024-11-26 PROCEDURE — 25000242 PHARM REV CODE 250 ALT 637 W/ HCPCS

## 2024-11-26 PROCEDURE — 63600175 PHARM REV CODE 636 W HCPCS: Performed by: ANESTHESIOLOGY

## 2024-11-26 PROCEDURE — 63600175 PHARM REV CODE 636 W HCPCS: Performed by: STUDENT IN AN ORGANIZED HEALTH CARE EDUCATION/TRAINING PROGRAM

## 2024-11-26 PROCEDURE — 37000009 HC ANESTHESIA EA ADD 15 MINS: Performed by: STUDENT IN AN ORGANIZED HEALTH CARE EDUCATION/TRAINING PROGRAM

## 2024-11-26 PROCEDURE — 36000706: Performed by: STUDENT IN AN ORGANIZED HEALTH CARE EDUCATION/TRAINING PROGRAM

## 2024-11-26 PROCEDURE — 63600175 PHARM REV CODE 636 W HCPCS: Mod: JZ,JG | Performed by: STUDENT IN AN ORGANIZED HEALTH CARE EDUCATION/TRAINING PROGRAM

## 2024-11-26 PROCEDURE — 25000003 PHARM REV CODE 250: Performed by: STUDENT IN AN ORGANIZED HEALTH CARE EDUCATION/TRAINING PROGRAM

## 2024-11-26 PROCEDURE — 88304 TISSUE EXAM BY PATHOLOGIST: CPT | Mod: 59 | Performed by: PATHOLOGY

## 2024-11-26 PROCEDURE — 25000003 PHARM REV CODE 250: Performed by: ANESTHESIOLOGY

## 2024-11-26 PROCEDURE — 36000707: Performed by: STUDENT IN AN ORGANIZED HEALTH CARE EDUCATION/TRAINING PROGRAM

## 2024-11-26 PROCEDURE — 37000008 HC ANESTHESIA 1ST 15 MINUTES: Performed by: STUDENT IN AN ORGANIZED HEALTH CARE EDUCATION/TRAINING PROGRAM

## 2024-11-26 PROCEDURE — 81025 URINE PREGNANCY TEST: CPT | Performed by: ANESTHESIOLOGY

## 2024-11-26 PROCEDURE — 71000033 HC RECOVERY, INTIAL HOUR: Performed by: STUDENT IN AN ORGANIZED HEALTH CARE EDUCATION/TRAINING PROGRAM

## 2024-11-26 PROCEDURE — 71000015 HC POSTOP RECOV 1ST HR: Performed by: STUDENT IN AN ORGANIZED HEALTH CARE EDUCATION/TRAINING PROGRAM

## 2024-11-26 PROCEDURE — 71000016 HC POSTOP RECOV ADDL HR: Performed by: STUDENT IN AN ORGANIZED HEALTH CARE EDUCATION/TRAINING PROGRAM

## 2024-11-26 RX ORDER — PREGABALIN 75 MG/1
75 CAPSULE ORAL ONCE
Status: COMPLETED | OUTPATIENT
Start: 2024-11-26 | End: 2024-11-26

## 2024-11-26 RX ORDER — PROPOFOL 10 MG/ML
VIAL (ML) INTRAVENOUS CONTINUOUS PRN
Status: DISCONTINUED | OUTPATIENT
Start: 2024-11-26 | End: 2024-11-26

## 2024-11-26 RX ORDER — MIDAZOLAM HYDROCHLORIDE 1 MG/ML
INJECTION INTRAMUSCULAR; INTRAVENOUS
Status: DISCONTINUED | OUTPATIENT
Start: 2024-11-26 | End: 2024-11-26

## 2024-11-26 RX ORDER — OXYMETAZOLINE HCL 0.05 %
SPRAY, NON-AEROSOL (ML) NASAL
Status: DISCONTINUED | OUTPATIENT
Start: 2024-11-26 | End: 2024-11-26 | Stop reason: HOSPADM

## 2024-11-26 RX ORDER — ONDANSETRON 4 MG/1
8 TABLET, ORALLY DISINTEGRATING ORAL EVERY 8 HOURS PRN
Qty: 15 TABLET | Refills: 0 | Status: SHIPPED | OUTPATIENT
Start: 2024-11-26

## 2024-11-26 RX ORDER — DEXAMETHASONE SODIUM PHOSPHATE 4 MG/ML
INJECTION, SOLUTION INTRA-ARTICULAR; INTRALESIONAL; INTRAMUSCULAR; INTRAVENOUS; SOFT TISSUE
Status: DISCONTINUED | OUTPATIENT
Start: 2024-11-26 | End: 2024-11-26

## 2024-11-26 RX ORDER — SODIUM CHLORIDE, SODIUM LACTATE, POTASSIUM CHLORIDE, CALCIUM CHLORIDE 600; 310; 30; 20 MG/100ML; MG/100ML; MG/100ML; MG/100ML
INJECTION, SOLUTION INTRAVENOUS CONTINUOUS
Status: DISCONTINUED | OUTPATIENT
Start: 2024-11-26 | End: 2024-11-26 | Stop reason: HOSPADM

## 2024-11-26 RX ORDER — IBUPROFEN 600 MG/1
600 TABLET ORAL EVERY 6 HOURS PRN
Qty: 60 TABLET | Refills: 0 | Status: SHIPPED | OUTPATIENT
Start: 2024-11-26

## 2024-11-26 RX ORDER — SODIUM CHLORIDE 0.9 % (FLUSH) 0.9 %
10 SYRINGE (ML) INJECTION
Status: DISCONTINUED | OUTPATIENT
Start: 2024-11-26 | End: 2024-11-26 | Stop reason: HOSPADM

## 2024-11-26 RX ORDER — LIDOCAINE HYDROCHLORIDE 10 MG/ML
1 INJECTION, SOLUTION EPIDURAL; INFILTRATION; INTRACAUDAL; PERINEURAL ONCE
Status: DISCONTINUED | OUTPATIENT
Start: 2024-11-26 | End: 2024-11-26 | Stop reason: HOSPADM

## 2024-11-26 RX ORDER — ALBUTEROL SULFATE 2.5 MG/.5ML
2.5 SOLUTION RESPIRATORY (INHALATION) EVERY 4 HOURS PRN
Status: DISCONTINUED | OUTPATIENT
Start: 2024-11-26 | End: 2024-11-26 | Stop reason: HOSPADM

## 2024-11-26 RX ORDER — DEXAMETHASONE 4 MG/1
8 TABLET ORAL
Qty: 5 TABLET | Refills: 0 | Status: SHIPPED | OUTPATIENT
Start: 2024-11-30

## 2024-11-26 RX ORDER — ALBUTEROL SULFATE 2.5 MG/.5ML
SOLUTION RESPIRATORY (INHALATION)
Status: COMPLETED
Start: 2024-11-26 | End: 2024-11-26

## 2024-11-26 RX ORDER — OXYCODONE HCL 5 MG/5 ML
5 SOLUTION, ORAL ORAL EVERY 4 HOURS PRN
Qty: 210 ML | Refills: 0 | Status: SHIPPED | OUTPATIENT
Start: 2024-11-26 | End: 2024-11-26 | Stop reason: HOSPADM

## 2024-11-26 RX ORDER — DIPHENHYDRAMINE HYDROCHLORIDE 50 MG/ML
12.5 INJECTION INTRAMUSCULAR; INTRAVENOUS EVERY 30 MIN PRN
Status: DISCONTINUED | OUTPATIENT
Start: 2024-11-26 | End: 2024-11-26 | Stop reason: HOSPADM

## 2024-11-26 RX ORDER — LIDOCAINE HYDROCHLORIDE 10 MG/ML
0.5 INJECTION, SOLUTION EPIDURAL; INFILTRATION; INTRACAUDAL; PERINEURAL ONCE
Status: DISCONTINUED | OUTPATIENT
Start: 2024-11-26 | End: 2024-11-26 | Stop reason: HOSPADM

## 2024-11-26 RX ORDER — FENTANYL CITRATE 50 UG/ML
INJECTION, SOLUTION INTRAMUSCULAR; INTRAVENOUS
Status: DISCONTINUED | OUTPATIENT
Start: 2024-11-26 | End: 2024-11-26

## 2024-11-26 RX ORDER — LIDOCAINE HYDROCHLORIDE 20 MG/ML
INJECTION, SOLUTION EPIDURAL; INFILTRATION; INTRACAUDAL; PERINEURAL
Status: DISCONTINUED | OUTPATIENT
Start: 2024-11-26 | End: 2024-11-26

## 2024-11-26 RX ORDER — PROCHLORPERAZINE EDISYLATE 5 MG/ML
5 INJECTION INTRAMUSCULAR; INTRAVENOUS EVERY 30 MIN PRN
Status: DISCONTINUED | OUTPATIENT
Start: 2024-11-26 | End: 2024-11-26 | Stop reason: HOSPADM

## 2024-11-26 RX ORDER — OXYCODONE HYDROCHLORIDE 5 MG/1
5 TABLET ORAL EVERY 4 HOURS PRN
Status: DISCONTINUED | OUTPATIENT
Start: 2024-11-26 | End: 2024-11-26 | Stop reason: HOSPADM

## 2024-11-26 RX ORDER — SODIUM CHLORIDE 0.9 % (FLUSH) 0.9 %
3 SYRINGE (ML) INJECTION
Status: DISCONTINUED | OUTPATIENT
Start: 2024-11-26 | End: 2024-11-26 | Stop reason: HOSPADM

## 2024-11-26 RX ORDER — HYDROMORPHONE HYDROCHLORIDE 2 MG/ML
INJECTION, SOLUTION INTRAMUSCULAR; INTRAVENOUS; SUBCUTANEOUS
Status: DISCONTINUED | OUTPATIENT
Start: 2024-11-26 | End: 2024-11-26

## 2024-11-26 RX ORDER — ACETAMINOPHEN 500 MG
1000 TABLET ORAL
Status: COMPLETED | OUTPATIENT
Start: 2024-11-26 | End: 2024-11-26

## 2024-11-26 RX ORDER — MEPERIDINE HYDROCHLORIDE 25 MG/ML
12.5 INJECTION INTRAMUSCULAR; INTRAVENOUS; SUBCUTANEOUS ONCE AS NEEDED
Status: DISCONTINUED | OUTPATIENT
Start: 2024-11-26 | End: 2024-11-26 | Stop reason: HOSPADM

## 2024-11-26 RX ORDER — ROCURONIUM BROMIDE 10 MG/ML
INJECTION, SOLUTION INTRAVENOUS
Status: DISCONTINUED | OUTPATIENT
Start: 2024-11-26 | End: 2024-11-26

## 2024-11-26 RX ORDER — HYDROMORPHONE HYDROCHLORIDE 2 MG/ML
0.4 INJECTION, SOLUTION INTRAMUSCULAR; INTRAVENOUS; SUBCUTANEOUS EVERY 5 MIN PRN
Status: DISCONTINUED | OUTPATIENT
Start: 2024-11-26 | End: 2024-11-26 | Stop reason: HOSPADM

## 2024-11-26 RX ORDER — PROPOFOL 10 MG/ML
VIAL (ML) INTRAVENOUS
Status: DISCONTINUED | OUTPATIENT
Start: 2024-11-26 | End: 2024-11-26

## 2024-11-26 RX ORDER — CLINDAMYCIN PHOSPHATE 900 MG/50ML
900 INJECTION, SOLUTION INTRAVENOUS
Status: COMPLETED | OUTPATIENT
Start: 2024-11-26 | End: 2024-11-26

## 2024-11-26 RX ORDER — GLUCAGON 1 MG
1 KIT INJECTION
Status: DISCONTINUED | OUTPATIENT
Start: 2024-11-26 | End: 2024-11-26 | Stop reason: HOSPADM

## 2024-11-26 RX ORDER — HYDROCODONE BITARTRATE AND ACETAMINOPHEN 7.5; 325 MG/15ML; MG/15ML
15 SOLUTION ORAL EVERY 4 HOURS PRN
Qty: 630 ML | Refills: 0 | Status: SHIPPED | OUTPATIENT
Start: 2024-11-26

## 2024-11-26 RX ORDER — FLUMAZENIL 0.1 MG/ML
INJECTION INTRAVENOUS
Status: DISCONTINUED | OUTPATIENT
Start: 2024-11-26 | End: 2024-11-26

## 2024-11-26 RX ORDER — DEXMEDETOMIDINE HYDROCHLORIDE 100 UG/ML
INJECTION, SOLUTION INTRAVENOUS
Status: DISCONTINUED | OUTPATIENT
Start: 2024-11-26 | End: 2024-11-26

## 2024-11-26 RX ORDER — ACETAMINOPHEN 500 MG
500 TABLET ORAL EVERY 6 HOURS PRN
Qty: 60 TABLET | Refills: 0 | Status: SHIPPED | OUTPATIENT
Start: 2024-11-26 | End: 2024-11-26 | Stop reason: HOSPADM

## 2024-11-26 RX ORDER — ONDANSETRON HYDROCHLORIDE 2 MG/ML
INJECTION, SOLUTION INTRAMUSCULAR; INTRAVENOUS
Status: DISCONTINUED | OUTPATIENT
Start: 2024-11-26 | End: 2024-11-26

## 2024-11-26 RX ADMIN — ALBUTEROL SULFATE 2.5 MG: 2.5 SOLUTION RESPIRATORY (INHALATION) at 08:11

## 2024-11-26 RX ADMIN — FENTANYL CITRATE 100 MCG: 50 INJECTION, SOLUTION INTRAMUSCULAR; INTRAVENOUS at 07:11

## 2024-11-26 RX ADMIN — FLUMAZENIL 0.5 MG: 0.1 INJECTION, SOLUTION INTRAVENOUS at 07:11

## 2024-11-26 RX ADMIN — HYDROMORPHONE HYDROCHLORIDE 0.4 MG: 2 INJECTION INTRAMUSCULAR; INTRAVENOUS; SUBCUTANEOUS at 08:11

## 2024-11-26 RX ADMIN — DEXAMETHASONE SODIUM PHOSPHATE 8 MG: 4 INJECTION, SOLUTION INTRAMUSCULAR; INTRAVENOUS at 07:11

## 2024-11-26 RX ADMIN — DEXMEDETOMIDINE HYDROCHLORIDE 10 MCG: 100 INJECTION, SOLUTION, CONCENTRATE INTRAVENOUS at 07:11

## 2024-11-26 RX ADMIN — ROCURONIUM BROMIDE 50 MG: 10 SOLUTION INTRAVENOUS at 07:11

## 2024-11-26 RX ADMIN — PREGABALIN 75 MG: 75 CAPSULE ORAL at 05:11

## 2024-11-26 RX ADMIN — PROPOFOL 200 MG: 10 INJECTION, EMULSION INTRAVENOUS at 07:11

## 2024-11-26 RX ADMIN — SODIUM CHLORIDE, SODIUM LACTATE, POTASSIUM CHLORIDE, AND CALCIUM CHLORIDE: .6; .31; .03; .02 INJECTION, SOLUTION INTRAVENOUS at 06:11

## 2024-11-26 RX ADMIN — SUGAMMADEX 200 MG: 100 INJECTION, SOLUTION INTRAVENOUS at 07:11

## 2024-11-26 RX ADMIN — ACETAMINOPHEN 1000 MG: 500 TABLET, FILM COATED ORAL at 05:11

## 2024-11-26 RX ADMIN — ONDANSETRON 4 MG: 2 INJECTION INTRAMUSCULAR; INTRAVENOUS at 07:11

## 2024-11-26 RX ADMIN — CLINDAMYCIN PHOSPHATE 900 MG: 18 INJECTION, SOLUTION INTRAVENOUS at 07:11

## 2024-11-26 RX ADMIN — MIDAZOLAM HYDROCHLORIDE 2 MG: 1 INJECTION, SOLUTION INTRAMUSCULAR; INTRAVENOUS at 06:11

## 2024-11-26 RX ADMIN — HYDROMORPHONE HYDROCHLORIDE 0.4 MG: 2 INJECTION INTRAMUSCULAR; INTRAVENOUS; SUBCUTANEOUS at 07:11

## 2024-11-26 RX ADMIN — PROPOFOL 100 MCG/KG/MIN: 10 INJECTION, EMULSION INTRAVENOUS at 07:11

## 2024-11-26 RX ADMIN — LIDOCAINE HYDROCHLORIDE 100 MG: 20 INJECTION, SOLUTION EPIDURAL; INFILTRATION; INTRACAUDAL; PERINEURAL at 07:11

## 2024-11-26 NOTE — ANESTHESIA PROCEDURE NOTES
Intubation    Date/Time: 11/26/2024 7:03 AM    Performed by: Elena Ugarte CRNA  Authorized by: Matteo Perry MD    Intubation:     Induction:  Intravenous    Intubated:  Postinduction    Mask Ventilation:  Easy with oral airway    Attempts:  1    Attempted By:  CRNA    Method of Intubation:  Video laryngoscopy    Blade:  Mark 3    Laryngeal View Grade: Grade I - full view of cords      Difficult Airway Encountered?: No      Complications:  None    Airway Device:  Oral endotracheal tube    Airway Device Size:  7.5    Style/Cuff Inflation:  Cuffed (inflated to minimal occlusive pressure)    Tube secured:  22    Secured at:  The lips    Placement Verified By:  Capnometry    Complicating Factors:  None    Findings Post-Intubation:  BS equal bilateral and atraumatic/condition of teeth unchanged

## 2024-11-26 NOTE — ANESTHESIA POSTPROCEDURE EVALUATION
Anesthesia Post Evaluation    Patient: Talia Colmenares    Procedure(s) Performed: Procedure(s) (LRB):  TONSILLECTOMY (N/A)    Final Anesthesia Type: general      Patient location during evaluation: PACU  Patient participation: Yes- Able to Participate  Level of consciousness: awake and alert  Post-procedure vital signs: reviewed and stable  Pain management: adequate  Airway patency: patent    PONV status at discharge: No PONV  Anesthetic complications: no      Cardiovascular status: blood pressure returned to baseline  Respiratory status: unassisted, spontaneous ventilation and room air  Hydration status: euvolemic  Follow-up not needed.          Vitals Value Taken Time   /71 11/26/24 0755   Temp 36.2 °C (97.1 °F) 11/26/24 0755   Pulse 89 11/26/24 0755   Resp 18 11/26/24 0755   SpO2 100 % 11/26/24 0755         No case tracking events are documented in the log.      Pain/Malissa Score: Pain Rating Prior to Med Admin: 0 (11/26/2024  5:58 AM)

## 2024-11-26 NOTE — Clinical Note
November 26, 2024         [address]  [place] [address]  Phone: [phone]  Fax: [phone]       Patient: Talia Colmenares   YOB: 1985  Date of Visit: 11/26/2024    To Whom It May Concern:    Marito Colmenares  was at [facility] on 11/26/2024. The patient may return to work/school on *** {With/no:10909} restrictions. If you have any questions or concerns, or if I can be of further assistance, please do not hesitate to contact me.    Sincerely,    Jairo Sweeney RN

## 2024-11-26 NOTE — OP NOTE
[facility]  Surgery Department  Operative Note    SUMMARY     Date of Procedure: 11/26/2024     Procedure: Procedure(s) (LRB):  TONSILLECTOMY (N/A)     Surgeons and Role:     * Abdulaziz Kan MD - Primary    Assisting Surgeon: None    Pre-Operative Diagnosis: Recurrent tonsillitis [J03.91]    Post-Operative Diagnosis: Post-Op Diagnosis Codes:     * Recurrent tonsillitis [J03.91]    Anesthesia: General    Operative Findings (including complications, if any):   3+ cryptic tonsils with tonsil stones present    Description of Technical Procedures:     After written consent was obtained, the patient was brought the operating room and placed supine on the OR table. General anesthesia was achieved and the patient was intubated with an appropriately sized endotracheal tube. A timeout was performed to confirm patient information and procedure details in accordance with WHO standards. The bed was 90 degrees to the left. The patient was then prepped and draped in standard fashion.     A Loan-Sae retractor was inserted into the patient's mouth and suspended on the ghotra stand. A red rubber catheter was inserted through the nose and clamped to suspend the soft palate away from the tonsils. An savana clamp was used to retract the right tonsil medially. Dissection in the extracapsular plane was performed using electrocautery.  The tonsil was then marked and sent has a specimen to pathology.  After the tonsil was removed hemostasis of the tonsillar fossa was achieved using the suction Bovie.  A tonsil ball coated in epinephrine was placed in the tonsillar fossa to aid in hemostasis.  Next attention was turned to the left tonsil and the procedure repeated in its entirety. Primary areas of bleeding were in the left inferior pole and right superior pole.     At this point the procedure was deemed complete. A orogastric tube was used to evacuate the stomach and upper gastrointestinal tract. The loan-sae and red  rubber catheter were removed atraumatically. The patient was turned back toward anesthesia. After being awoken from anesthesia the patient was transported to PACU for recovery in stable condition. The patient tolerated the procedure well and without complaint.       Significant Surgical Tasks Conducted by the Assistant(s), if Applicable: None    Estimated Blood Loss (EBL):  20 cc    Implants: * No implants in log *    Specimens:   Specimen (24h ago, onward)       Start     Ordered    11/26/24 0720  Specimen to Pathology, Surgery ENT  Once        Comments: Pre-op Diagnosis: Recurrent tonsillitis [J03.91]Procedure(s):TONSILLECTOMY Number of specimens: 2Name of specimens: 1) Right tonsil2) Left tonsil     References:    Click here for ordering Quick Tip   Question Answer Comment   Procedure Type: ENT    Specimen Class: Routine/Screening    Release to patient Immediate        11/26/24 0787                            Condition: Good    Disposition: PACU - hemodynamically stable.    Attestation: Op Note Attestation: I performed the procedure.

## 2024-11-26 NOTE — BRIEF OP NOTE
[facility] - Surgery ([place])  Brief Operative Note    Surgery Date: 11/26/2024     Surgeons and Role:     * Abdulaziz Kan MD - Primary    Assisting Surgeon: None    Pre-op Diagnosis:  Recurrent tonsillitis [J03.91]    Post-op Diagnosis:  Post-Op Diagnosis Codes:     * Recurrent tonsillitis [J03.91]    Procedure(s) (LRB):  TONSILLECTOMY (N/A)    Anesthesia: General    Operative Findings: See full op note    Estimated Blood Loss: 15 cc         Specimens:   Specimen (24h ago, onward)       Start     Ordered    11/26/24 0720  Specimen to Pathology, Surgery ENT  Once        Comments: Pre-op Diagnosis: Recurrent tonsillitis [J03.91]Procedure(s):TONSILLECTOMY Number of specimens: 2Name of specimens: 1) Right tonsil2) Left tonsil     References:    Click here for ordering Quick Tip   Question Answer Comment   Procedure Type: ENT    Specimen Class: Routine/Screening    Release to patient Immediate        11/26/24 0735                      Discharge Note    OUTCOME: Patient tolerated treatment/procedure well without complication and is now ready for discharge.    DISPOSITION: Home or Self Care    FINAL DIAGNOSIS:  Tonsillar Hypertrophy    FOLLOWUP: In clinic    DISCHARGE INSTRUCTIONS:    Discharge Procedure Orders   Diet Adult Regular   Order Comments: Avoid spicy foods or foods with sharp edges (ex. Potato chips)     No driving until:   Order Comments: Off opiates     Activity as tolerated

## 2024-11-26 NOTE — INTERVAL H&P NOTE
The patient has been examined and the H&P has been reviewed:    I concur with the findings and no changes have occurred since H&P was written.    Surgery risks, benefits and alternative options discussed and understood by patient/family.          Active Hospital Problems    Diagnosis  POA    Tonsillolith [J35.8]  Yes      Resolved Hospital Problems   No resolved problems to display.

## 2024-11-26 NOTE — OR NURSING
Pt states pain tolerable. Falls off to sleep. Snoring. No change from previous assessment. Prepared for transfer to acu.

## 2024-11-26 NOTE — PLAN OF CARE
Talia Colmenares has met all discharge criteria from Phase II. Vital Signs are stable, ambulating  without difficulty. Discharge instructions given, patient verbalized understanding. Discharged from facility via wheelchair in stable condition.

## 2024-11-26 NOTE — TRANSFER OF CARE
Anesthesia Transfer of Care Note    Patient: Talia Colmenares    Procedure(s) Performed: Procedure(s) (LRB):  TONSILLECTOMY (N/A)    Patient location: PACU    Anesthesia Type: general    Transport from OR: Transported from OR on 6-10 L/min O2 by face mask with adequate spontaneous ventilation    Post pain: adequate analgesia    Post assessment: no apparent anesthetic complications and tolerated procedure well    Post vital signs: stable    Level of consciousness: awake and alert    Nausea/Vomiting: no nausea/vomiting    Complications: none    Transfer of care protocol was followed      Last vitals: Visit Vitals  /81 (BP Location: Left arm, Patient Position: Lying)   Pulse 92   Temp 36.8 °C (98.3 °F) (Oral)   Resp 18   LMP  (LMP Unknown)   SpO2 98%   Breastfeeding No

## 2024-11-26 NOTE — DISCHARGE INSTRUCTIONS
"Postoperative Instructions for TONSILLECTOMY    If you have any concerns following surgery that require immediate attention prior to postop clinic visit please call one of the following numbers:     FOR NON-URGENT ISSUES  For any postoperative issues please call [facility] Monday through Friday, 8am-5pm:  [phone]    If you are unable to reach anyone from the above listed number, please call the  at [facility] on [address]: [phone] (Ask to speak to the ENT resident or physician on call)    FOR EMERGENT ISSUES  For any emergent issues please go to the   [facility] (M-F 8a-3p) OR  [facility] OR   The nearest Emergency Department OR   Call 911    Follow-up Appointment  DATE: 11/12/24 @ 9:15  [facility], 2nd Floor    Surgery:   Removal of the tonsils requires general anesthesia.  The procedure typically lasts 30 minutes followed by observation in the recovery room until the patient is tolerating liquids. (Typically 1 hour.)  In cases where the patient cannot tolerate liquids or in other special circumstances, he/she may be observed overnight.    Postoperative Diet  The most important concern after surgery is dehydration.  The patient needs to drink plenty of fluids.  If he/she feels like eating, any food that does not have sharp edges is acceptable. If it "crunches" it is off limits.  I recommend trying a very small piece/sip of acidic or spicy items before eating/drinking a large amount as they may cause pain.      Postoperative Pain Control  Patients can have a severe sore throat for approximately 7-10 days after surgery.  This can vary depending on pain tolerance, age, and frequency of infections prior to surgery.  There are typically two times when the pain is most severe: the day following surgery and 5-7 days after surgery when the eschar (scabs) begin to fall off.  It is this second peak that is the most " important for controlling pain and encouraging fluids as dehydration at this point may lead to bleeding.    You will be given a prescription for pain medication (typically hydrocodone/acetaminophen given up to every 4 hours ) and may also take Celebrex (motrin) up to every 12 hours starting the day after surgery. You can also take 1 teaspoon of honey every 6 hours if you are not diabetic. This has been shown to help control pain in the post-operative period.    Bleeding  There is a 1-3% risk of bleeding. It is normal to see some blood-tinged spit after surgery. Please seek care for any bleeding over 1 tbsp per hour.   Please call the clinic or ENT on call and go to your nearest Emergency Room for any bleeding.  Again, adequate hydration can usually prevent bleeding.  Often rehydration with IV fluids will resolve the problem.  Occasionally the patient will need to return to the OR for cautery.    Frequently asked questions:   Postoperative fever is common after surgery.  It can reach as high as 102F.  Use the motrin and lortab to control this.  If there is a fever as well as a new symptom such as cough, call the clinic.  Following tonsillectomy there will be two large white patches on the back of the throat. These are essentially wet scabs from the surgery. It is not thrush or infection.  Over the next week, these scabs will resolve.  Frequently, patients will complain of ear pain.  This is referred pain from the throat.  Treat it as throat pain with pain medication.  Frequently patients will have halitosis (bad breath) after surgery.  Avoid mouth washes as they contain alcohol and may sting.  Brushing the teeth is okay.  Use of straws are okay.  You should limit yourself to light activity for 1 week postop. Avoid heavy lifting over 30-40lbs. Avoid strenuous cardio.   The new guidelines show that antibiotics are not recommended after surgery as they do not help with pain or fever.  For this reason, you will not have  any antibiotics after surgery.  It is ok to resume your normal oral hygiene and showering. Avoid any alcohol-based mouthwashes as they may be painful.   Do not use any throat sprays, rinses, or lozenges unless otherwise instructed to do so.

## 2024-11-27 VITALS
RESPIRATION RATE: 16 BRPM | DIASTOLIC BLOOD PRESSURE: 71 MMHG | HEART RATE: 86 BPM | OXYGEN SATURATION: 96 % | TEMPERATURE: 99 F | SYSTOLIC BLOOD PRESSURE: 127 MMHG

## 2024-12-02 LAB
FINAL PATHOLOGIC DIAGNOSIS: NORMAL
GROSS: NORMAL
Lab: NORMAL

## 2024-12-11 ENCOUNTER — OFFICE VISIT (OUTPATIENT)
Dept: OTOLARYNGOLOGY | Facility: CLINIC | Age: 39
End: 2024-12-11
Payer: MEDICAID

## 2024-12-11 VITALS
SYSTOLIC BLOOD PRESSURE: 115 MMHG | HEIGHT: 65 IN | WEIGHT: 235.44 LBS | RESPIRATION RATE: 18 BRPM | BODY MASS INDEX: 39.22 KG/M2 | DIASTOLIC BLOOD PRESSURE: 79 MMHG | HEART RATE: 99 BPM

## 2024-12-11 DIAGNOSIS — J35.8 TONSILLOLITH: Primary | ICD-10-CM

## 2024-12-11 PROCEDURE — 3074F SYST BP LT 130 MM HG: CPT | Mod: CPTII,S$GLB,, | Performed by: STUDENT IN AN ORGANIZED HEALTH CARE EDUCATION/TRAINING PROGRAM

## 2024-12-11 PROCEDURE — 99024 POSTOP FOLLOW-UP VISIT: CPT | Mod: S$GLB,,, | Performed by: STUDENT IN AN ORGANIZED HEALTH CARE EDUCATION/TRAINING PROGRAM

## 2024-12-11 PROCEDURE — 3078F DIAST BP <80 MM HG: CPT | Mod: CPTII,S$GLB,, | Performed by: STUDENT IN AN ORGANIZED HEALTH CARE EDUCATION/TRAINING PROGRAM

## 2024-12-11 PROCEDURE — 1159F MED LIST DOCD IN RCRD: CPT | Mod: CPTII,S$GLB,, | Performed by: STUDENT IN AN ORGANIZED HEALTH CARE EDUCATION/TRAINING PROGRAM

## 2024-12-11 PROCEDURE — 1160F RVW MEDS BY RX/DR IN RCRD: CPT | Mod: CPTII,S$GLB,, | Performed by: STUDENT IN AN ORGANIZED HEALTH CARE EDUCATION/TRAINING PROGRAM

## 2024-12-11 NOTE — PROGRESS NOTES
Ear, Nose, & Throat  Otolaryngology - Head & Neck Surgery      Subjective:     Chief Complaint: Postop    Procedure: Tonsillectomy  Date:  11/26/2024    Interval: Doing well. Maintaining soft diet. No longer requiring opiate pain control      Objective:     LMP  (LMP Unknown) Comment: upt cup given     Tonsillar fossas healing appropriately   Late fibrinous exudate  Uvular swelling within normal limits of healing    Data Review:     PATH      I have independently reviewed the lab results shown above. Findings significant for the conditions being treated include:  Consistent with recurrent tonsillitis    Procedures:       Assessment:     1. Tonsillolith        Plan:     RTC prn

## 2025-01-30 ENCOUNTER — PATIENT MESSAGE (OUTPATIENT)
Dept: RESEARCH | Facility: OTHER | Age: 40
End: 2025-01-30
Payer: MEDICAID

## 2025-02-05 ENCOUNTER — OFFICE VISIT (OUTPATIENT)
Dept: PODIATRY | Facility: CLINIC | Age: 40
End: 2025-02-05
Payer: MEDICAID

## 2025-02-05 VITALS
WEIGHT: 234.13 LBS | BODY MASS INDEX: 39.01 KG/M2 | SYSTOLIC BLOOD PRESSURE: 122 MMHG | HEART RATE: 100 BPM | HEIGHT: 65 IN | DIASTOLIC BLOOD PRESSURE: 81 MMHG

## 2025-02-05 DIAGNOSIS — M72.2 PLANTAR FASCIITIS, LEFT: ICD-10-CM

## 2025-02-05 DIAGNOSIS — Z00.00 NORMAL FOOT EXAM: Primary | ICD-10-CM

## 2025-02-05 DIAGNOSIS — M76.822 INSUFFICIENCY OF LEFT POSTERIOR TIBIAL TENDON: ICD-10-CM

## 2025-02-05 DIAGNOSIS — E66.9 OBESITY (BMI 35.0-39.9 WITHOUT COMORBIDITY): ICD-10-CM

## 2025-02-05 DIAGNOSIS — M79.673 HEEL PAIN: ICD-10-CM

## 2025-02-05 DIAGNOSIS — M25.472 ANKLE SWELLING, LEFT: ICD-10-CM

## 2025-02-05 PROCEDURE — 99213 OFFICE O/P EST LOW 20 MIN: CPT | Mod: PBBFAC,PN | Performed by: PODIATRIST

## 2025-02-05 PROCEDURE — 3079F DIAST BP 80-89 MM HG: CPT | Mod: CPTII,,, | Performed by: PODIATRIST

## 2025-02-05 PROCEDURE — 1159F MED LIST DOCD IN RCRD: CPT | Mod: CPTII,,, | Performed by: PODIATRIST

## 2025-02-05 PROCEDURE — 3074F SYST BP LT 130 MM HG: CPT | Mod: CPTII,,, | Performed by: PODIATRIST

## 2025-02-05 PROCEDURE — 99999 PR PBB SHADOW E&M-EST. PATIENT-LVL III: CPT | Mod: PBBFAC,,, | Performed by: PODIATRIST

## 2025-02-05 PROCEDURE — 3008F BODY MASS INDEX DOCD: CPT | Mod: CPTII,,, | Performed by: PODIATRIST

## 2025-02-05 PROCEDURE — 99203 OFFICE O/P NEW LOW 30 MIN: CPT | Mod: S$PBB,,, | Performed by: PODIATRIST

## 2025-02-05 NOTE — PATIENT INSTRUCTIONS
PPT arch pad w/ adhesive - medium  PPT met bar w/ adhesive - medium    Visco-gel universal metatarsal strap - large/xlarge - right and left

## 2025-02-05 NOTE — LETTER
February 5, 2025      [facility]  [address]  [place] [address]  Phone: [phone]  Fax: [phone]       Patient: Talia Colmenares   YOB: 1985  Date of Visit: 02/05/2025    To Whom It May Concern:    Marito Colmenares  was at [facility] on 02/05/2025. The patient may return to work/school on 2/6/2025 with no restrictions. If you have any questions or concerns, or if I can be of further assistance, please do not hesitate to contact me.    Sincerely,    Annamaria Mcclelland MA

## 2025-02-05 NOTE — PROGRESS NOTES
Subjective:      Patient ID: Talia Colmenares is a 39 y.o. female.    Chief Complaint: Foot Pain (Right and left)    Talia is a 39 y.o. female who presents new to the podiatry clinic stating wants full evaluation of foot. She c/o B/L foot pain/ ankle pain that goes up leg. Pain level can be up to 9/10 @ its worst. Did have an ankle injury L earlier in  for which she went to ED. Relates had a sudden sharp pain L arch & extended proximal. Was put in an airsplint for the rest of the school yr.& pain subsided. Now has pain that comes & goes - recurred 2 days ago.  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 Now works @ [employer] & uses primarily tennis shoes (Saucony today). In slides @ home.    [place] [facility] [place]     PCP Princess Jorge MD - Michelle Carroll    Past Medical History:   Diagnosis Date    GERD (gastroesophageal reflux disease)     Heart murmur     History of bronchitis     as a child     History of pre-eclampsia in prior pregnancy, currently pregnant     history with previous two     Hypertension     high bp off and on with this pregnancy    Rectal bleeding      Patient Active Problem List   Diagnosis    GERD (gastroesophageal reflux disease)    History of pre-eclampsia in prior pregnancy, currently pregnant    H/O  section    Trichomonas - treated with negative OLGA    Dehydration    Supervision of other normal pregnancy    Abdominal pain in pregnancy    Pregnancy with one fetus    wants btl    Tonsillolith    Obesity (BMI 35.0-39.9 without comorbidity)      Objective:      X-Ray Foot Complete Left  Order: [id number]  Status: Final result       Visible to patient: Yes (seen)       Next appt: 2025 at 08:30 AM in Podiatry (Rebecca Metzger DPM)       Dx: Heel pain    0 Result Notes  Details    Reading Physician Reading Date Result Priority   Nik Gray MD  [phone] 3/26/2024      Narrative & Impression  EXAMINATION:  XR ANKLE COMPLETE 3 VIEW LEFT; XR FOOT COMPLETE 3 VIEW  LEFT     CLINICAL HISTORY:  Effusion, left ankle; Pain in unspecified foot     TECHNIQUE:  Three views of the left foot and ankle were performed.     COMPARISON:  None     FINDINGS:  No evidence of a displaced fracture or focal osseous destructive process.     No evidence of dislocation.     No advanced degenerative change.     Moderate soft tissue swelling over the medial ankle     Impression:     Moderate soft tissue swelling over the medial ankle, without evidence of a displaced fracture or dislocation.        Electronically signed by:Nik Gray MD  Date:                                            03/26/2024  Time:                                           13:29   X-Ray Ankle Complete Left  Order: [id number]  Status: Final result       Visible to patient: Yes (seen)       Next appt: 03/19/2025 at 08:30 AM in Podiatry (Rebecca Metzger DPM)       Dx: Ankle swelling, left    0 Result Notes  Details    Reading Physician Reading Date Result Priority   Nik Gray MD  [phone] 3/26/2024 STAT     Narrative & Impression  EXAMINATION:  XR ANKLE COMPLETE 3 VIEW LEFT; XR FOOT COMPLETE 3 VIEW LEFT     CLINICAL HISTORY:  Effusion, left ankle; Pain in unspecified foot     TECHNIQUE:  Three views of the left foot and ankle were performed.     COMPARISON:  None     FINDINGS:  No evidence of a displaced fracture or focal osseous destructive process.     No evidence of dislocation.     No advanced degenerative change.     Moderate soft tissue swelling over the medial ankle     Impression:     Moderate soft tissue swelling over the medial ankle, without evidence of a displaced fracture or dislocation.        Electronically signed by:Nik Gray MD  Date:                                            03/26/2024  Time:                                           13:29   I independently reviewed the Xray images.    Review of Systems   Constitutional: Negative for malaise/fatigue.   Cardiovascular:  Positive for leg swelling.  Negative for claudication.   Skin:  Negative for color change and suspicious lesions.   Musculoskeletal:  Negative for falls, joint pain, muscle weakness and myalgias.   Neurological:  Negative for focal weakness, loss of balance, sensory change and weakness.   Psychiatric/Behavioral:  The patient is not nervous/anxious.      Physical Exam  Vitals reviewed.   Constitutional:       General: She is not in acute distress.     Appearance: She is well-developed. She is morbidly obese.   Cardiovascular:      Pulses: Normal pulses.           Dorsalis pedis pulses are 2+ on the right side and 2+ on the left side.   Musculoskeletal:         General: Tenderness and signs of injury (patient states spontaneous onset of pain but no specific injuryudde) present. No swelling.      Right lower leg: No edema.      Left lower leg: No edema.      Left foot: Deformity (pes planus B/L) present.   Feet:      Comments: Equinus B/L ankles w/ < 90 deg foot to leg noted w/ knees extended.      MS strength of extrinsics to foot & ankle B/L + 5/5 in DF/PF/Inv/Ev to resistance w/ no reproduction of pain in any direction.   TTP plantar medial heel insertion of fascia into calcaneus L. No radiating pain nor TTP along PTT.    Passive ROM of ankle & pedal joints is painless & w/out crepitation B/L.     Skin:     General: Skin is warm and dry.      Capillary Refill: Capillary refill takes less than 2 seconds.      Findings: No bruising, erythema or lesion.   Neurological:      Mental Status: She is alert and oriented to person, place, and time.      Motor: Motor function is intact. No weakness or abnormal muscle tone.      Gait: Gait is intact. Gait normal.   Psychiatric:         Mood and Affect: Mood and affect normal.         Behavior: Behavior normal. Behavior is cooperative.         Assessment:      Encounter Diagnoses   Name Primary?    Heel pain     Ankle swelling, left     Normal foot exam Yes    Obesity (BMI 35.0-39.9 without comorbidity)      Plantar fasciitis, left     Insufficiency of left posterior tibial tendon        Problem List Items Addressed This Visit          Endocrine    Obesity (BMI 35.0-39.9 without comorbidity)    Current Assessment & Plan     BMI 39.57 kg/m2 - follow w/ PCP.          Other Visit Diagnoses       Normal foot exam    -  Primary    Heel pain        Ankle swelling, left        Plantar fasciitis, left        Insufficiency of left posterior tibial tendon              Plan:       Talia was seen today for foot pain.    Diagnoses and all orders for this visit:    Normal foot exam    Heel pain  -     Ambulatory referral/consult to Orthopedics    Ankle swelling, left  -     Ambulatory referral/consult to Orthopedics    Obesity (BMI 35.0-39.9 without comorbidity)    Plantar fasciitis, left    Insufficiency of left posterior tibial tendon    I counseled the patient on her conditions, their implications & medical mgmt.    I reviewed imaging, clinical history, & diagnosis as above & d/w the patient. I attempted to use layman's terms to educate the patient.     -Discussed shoe choice.   The importance of wearing shoes w/ adequate arch supports to alleviate abnormal pressure & improve stability of foot while walking.   Avoid flat shoes or barefoot walking as these will exacerbate or worsen symptoms.  .   -Discussed non-prescription inserts vs MLA supports all times WB including @ home..  PPT arch pads & met.bar added to current shoe gear/ inserts .  Dispensed Silopos gelstrap for MLA other shoe gear.   -Rx/ OTC topical NSAID Voltaren gel up to qid prn vs PO NSAID.  Other options include PO Medrol dosepak OR injection of LA & steroid.    F/u 3-4 wks., sooner prn.        A total of 33 mins.was spent on chart review, patient visit & documentation.

## 2025-02-06 PROBLEM — E66.9 OBESITY (BMI 35.0-39.9 WITHOUT COMORBIDITY): Status: ACTIVE | Noted: 2025-02-06

## 2025-02-12 ENCOUNTER — HOSPITAL ENCOUNTER (EMERGENCY)
Facility: HOSPITAL | Age: 40
Discharge: HOME | End: 2025-02-12
Attending: EMERGENCY MEDICINE
Payer: MEDICAID

## 2025-02-12 VITALS
WEIGHT: 232 LBS | OXYGEN SATURATION: 99 % | BODY MASS INDEX: 38.65 KG/M2 | TEMPERATURE: 98 F | RESPIRATION RATE: 18 BRPM | SYSTOLIC BLOOD PRESSURE: 126 MMHG | DIASTOLIC BLOOD PRESSURE: 76 MMHG | HEART RATE: 83 BPM | HEIGHT: 65 IN

## 2025-02-12 DIAGNOSIS — R05.9 COUGH: ICD-10-CM

## 2025-02-12 DIAGNOSIS — J06.9 VIRAL URI WITH COUGH: Primary | ICD-10-CM

## 2025-02-12 LAB
B-HCG UR QL: NEGATIVE
CTP QC/QA: YES
MOLECULAR STREP A: NEGATIVE
POC MOLECULAR INFLUENZA A AGN: NEGATIVE
POC MOLECULAR INFLUENZA B AGN: NEGATIVE

## 2025-02-12 PROCEDURE — 87502 INFLUENZA DNA AMP PROBE: CPT

## 2025-02-12 PROCEDURE — 96372 THER/PROPH/DIAG INJ SC/IM: CPT

## 2025-02-12 PROCEDURE — 99284 EMERGENCY DEPT VISIT MOD MDM: CPT | Mod: 25

## 2025-02-12 PROCEDURE — 63600175 PHARM REV CODE 636 W HCPCS: Mod: JZ,TB

## 2025-02-12 PROCEDURE — 81025 URINE PREGNANCY TEST: CPT | Performed by: EMERGENCY MEDICINE

## 2025-02-12 PROCEDURE — 87651 STREP A DNA AMP PROBE: CPT

## 2025-02-12 RX ORDER — GUAIFENESIN 100 MG/5ML
100-200 SOLUTION ORAL EVERY 4 HOURS PRN
Qty: 60 ML | Refills: 0 | Status: SHIPPED | OUTPATIENT
Start: 2025-02-12 | End: 2025-02-12

## 2025-02-12 RX ORDER — GUAIFENESIN 100 MG/5ML
100-200 SOLUTION ORAL EVERY 4 HOURS PRN
Qty: 60 ML | Refills: 0 | Status: SHIPPED | OUTPATIENT
Start: 2025-02-12 | End: 2025-02-22

## 2025-02-12 RX ORDER — FLUTICASONE PROPIONATE 50 MCG
1 SPRAY, SUSPENSION (ML) NASAL 2 TIMES DAILY PRN
Qty: 15 G | Refills: 0 | Status: SHIPPED | OUTPATIENT
Start: 2025-02-12 | End: 2025-02-12

## 2025-02-12 RX ORDER — BENZONATATE 100 MG/1
100 CAPSULE ORAL 3 TIMES DAILY PRN
Qty: 15 CAPSULE | Refills: 0 | Status: SHIPPED | OUTPATIENT
Start: 2025-02-12 | End: 2025-02-17

## 2025-02-12 RX ORDER — LORATADINE 10 MG/1
10 TABLET ORAL DAILY
Qty: 30 TABLET | Refills: 0 | Status: SHIPPED | OUTPATIENT
Start: 2025-02-12 | End: 2025-02-12

## 2025-02-12 RX ORDER — LORATADINE 10 MG/1
10 TABLET ORAL DAILY
Qty: 30 TABLET | Refills: 0 | Status: SHIPPED | OUTPATIENT
Start: 2025-02-12 | End: 2025-03-14

## 2025-02-12 RX ORDER — BENZONATATE 100 MG/1
100 CAPSULE ORAL 3 TIMES DAILY PRN
Qty: 15 CAPSULE | Refills: 0 | Status: SHIPPED | OUTPATIENT
Start: 2025-02-12 | End: 2025-02-12

## 2025-02-12 RX ORDER — KETOROLAC TROMETHAMINE 30 MG/ML
15 INJECTION, SOLUTION INTRAMUSCULAR; INTRAVENOUS
Status: COMPLETED | OUTPATIENT
Start: 2025-02-12 | End: 2025-02-12

## 2025-02-12 RX ORDER — FLUTICASONE PROPIONATE 50 MCG
1 SPRAY, SUSPENSION (ML) NASAL 2 TIMES DAILY PRN
Qty: 16 G | Refills: 0 | Status: SHIPPED | OUTPATIENT
Start: 2025-02-12 | End: 2025-03-14

## 2025-02-12 RX ADMIN — KETOROLAC TROMETHAMINE 15 MG: 30 INJECTION, SOLUTION INTRAMUSCULAR at 02:02

## 2025-02-12 NOTE — Clinical Note
"Talia Tracey" Dedra was seen and treated in our emergency department on 2/12/2025.  She may return to work on 02/13/2025.       If you have any questions or concerns, please don't hesitate to call.      Leyla Prasad PA-C"

## 2025-02-12 NOTE — DISCHARGE INSTRUCTIONS
Thank you for coming to our Emergency Department today. It is important to remember that some problems or medical conditions are difficult to diagnose and may not be found or addressed during your Emergency Department visit.  These conditions often start with non-specific symptoms and can only be diagnosed on follow up visits with your primary care physician or specialist when the symptoms continue or change. Please remember that all medical conditions can change, and we cannot predict how you will be feeling tomorrow or the next day. Return to the ER with any questions/concerns, new/concerning symptoms, worsening or failure to improve.       Be sure to follow up with your primary care doctor and review all labs/imaging/tests that were performed during your ER visit with them. It is very common for us to identify non-emergent incidental findings which must be followed up with your primary care physician.  Some labs/imaging/tests may be outside of the normal range, and require non-emergent follow-up and/or further investigation/treatment/procedures/testing to help diagnose/exclude/prevent complications or other potentially serious medical conditions. Some abnormalities may not have been discussed or addressed during your ER visit.     An ER visit does not replace a primary care visit, and many screening tests or follow-up tests cannot be ordered by an ER doctor or performed by the ER. Some tests may even require pre-approval.    If you do not have a primary care doctor, you may contact the one listed on your discharge paperwork or you may also call the [facility] Appointment Desk at [phone] , or [address] [place] [address] at  [phone] to schedule an appointment, or establish care with a primary care doctor or even a specialist and to obtain information about local resources. It is important to your health that you have a primary care doctor.    Please take all medications as directed. We have done our best to select  a medication for you that will treat your condition however, all medications may potentially have side-effects and it is impossible to predict which medications may give you side-effects or what those side-effects (if any) those medications may give you.  If you feel that you are having a negative effect or side-effect of any medication you should stop taking those medications immediately and seek medical attention. If you feel that you are having a life-threatening reaction call 911.        Do not drive, swim, climb to height, take a bath, operate heavy machinery, drink alcohol or take potentially sedating medications, sign any legal documents or make any important decisions for 24 hours if you have received any pain medications, sedatives or mood altering drugs during your ER visit or within 24 hours of taking them if they have been prescribed to you.     You can find additional resources for Dentists, hearing aids, durable medical equipment, low cost pharmacies and other resources at [email or web address]

## 2025-02-13 NOTE — ED PROVIDER NOTES
Encounter Date: 2025       History     Chief Complaint   Patient presents with    Cough     Cough, sore throat, body aches, headache, chills, subjective fever x 5 days, worse 1 day ago.        Patient is a 39-year-old female with a past medical history of GERD who presents to the emergency department with complaints of cough, nasal congestion.  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  She has been taking over-the-counter medication with some relief.  She denies fever, chills, chest pain, shortness of breathe, weakness, numbness, tingling, nausea or vomiting.        Review of patient's allergies indicates:   Allergen Reactions    Mushroom Anaphylaxis    Pcn [penicillins] Hives and Swelling     Past Medical History:   Diagnosis Date    GERD (gastroesophageal reflux disease)     Heart murmur     History of bronchitis     as a child     History of pre-eclampsia in prior pregnancy, currently pregnant     history with previous two     Hypertension     high bp off and on with this pregnancy    Rectal bleeding      Past Surgical History:   Procedure Laterality Date     SECTION      x 3    TONSILLECTOMY N/A 2024    Procedure: TONSILLECTOMY;  Surgeon: Abdulaziz Kan MD;  Location: [facility];  Service: ENT;  Laterality: N/A;    TUBAL LIGATION       Family History   Problem Relation Name Age of Onset    Asthma Mother      Hypertension Mother      Arthritis Father      Diabetes Father      Hyperlipidemia Father      Hypertension Father      Cancer Maternal Grandfather      Diabetes Maternal Grandfather      Heart disease Maternal Grandfather      Cancer Paternal Grandmother      Diabetes Paternal Grandmother       Social History     Tobacco Use    Smoking status: Former     Types: Cigars     Quit date: 10/30/2013     Years since quittin.2    Smokeless tobacco: Never    Tobacco comments:     Does not use dip   Substance Use Topics    Alcohol  use: Yes     Comment: none 24 hr prior to surg    Drug use: No     Review of Systems   Constitutional:  Negative for chills and fever.   HENT:  Positive for congestion.    Respiratory:  Positive for cough. Negative for chest tightness and shortness of breath.    Cardiovascular:  Negative for chest pain and leg swelling.   Gastrointestinal:  Negative for abdominal pain and nausea.   Neurological:  Negative for dizziness and weakness.       Physical Exam     Initial Vitals [02/12/25 1005]   BP Pulse Resp Temp SpO2   124/76 100 18 98.4 °F (36.9 °C) 98 %      MAP       --         Physical Exam    Nursing note and vitals reviewed.  Constitutional: She appears well-developed and well-nourished. She is not diaphoretic. No distress.   HENT:   Head: Normocephalic and atraumatic.   Right Ear: External ear normal.   Left Ear: External ear normal.   Nose: Nose normal. Mouth/Throat: Oropharynx is clear and moist. No oropharyngeal exudate.   Eyes: Conjunctivae and EOM are normal.   Neck: No tracheal deviation present. No JVD present.   Normal range of motion.  Cardiovascular:  Normal rate.           Pulmonary/Chest: No stridor. No respiratory distress. She has no wheezes. She has no rhonchi. She has no rales.   Abdominal: She exhibits no distension. There is no abdominal tenderness. There is no rebound and no guarding.   Musculoskeletal:      Cervical back: Normal range of motion.     Neurological: She is alert and oriented to person, place, and time.   Skin: No rash noted. No erythema.   Psychiatric: She has a normal mood and affect.         ED Course   Procedures  Labs Reviewed   POCT INFLUENZA A/B MOLECULAR       Result Value    POC Molecular Influenza A Ag Negative      POC Molecular Influenza B Ag Negative       Acceptable Yes     POCT URINE PREGNANCY    POC Preg Test, Ur Negative       Acceptable Yes     POCT STREP A MOLECULAR    Molecular Strep A, POC Negative       Acceptable  Yes            Imaging Results              X-Ray Chest AP Portable (Final result)  Result time 02/12/25 12:35:07      Final result by Micheal Vanessa MD (02/12/25 12:35:07)                   Impression:      No acute abnormality.      Electronically signed by: Micheal Vanessa MD  Date:    02/12/2025  Time:    12:35               Narrative:    EXAMINATION:  XR CHEST AP PORTABLE    CLINICAL HISTORY:  Cough, unspecified    TECHNIQUE:  Single views of the chest were performed.    COMPARISON:  Chest radiograph dated March 4, 2016    FINDINGS:  The trachea and cardiomediastinal silhouette are within normal limits.  There is no evidence of pleural effusions, pneumothoraces or consolidations.  Lungs are clear.  Osseous structures demonstrate no evidence for acute fractures or dislocations.                                       Medications   ketorolac injection 15 mg (15 mg Intramuscular Given 2/12/25 1423)     Medical Decision Making  Patient is a well-appearing 39 y.o. female. Tolerating PO, non-toxic appearing. The patient remained comfortable and stable during their visit in the ED.  Details of ED course documented in ED workup.     Differential Diagnosis is considered, but is not limited to: COVID, Flu, Strep throat, Viral URI, PTA, RPA, pneumonia, acute otitis media, otitis externa, mastoiditis, sinusitis, viral gastroenteritis, measles, roseola.  Also considered but less likely:  PTA/RPA: no hot potato voice, no uvular deviation, no pain with passive neck flexion.  Esophageal rupture: No history of dysphagia.  Unlikely deep space infection/Boyd's, no submandibular or sublingual erythema or swelling.  Low suspicion for CNS infection/meningitis: no pain with passive neck flexion, no neck rigidity/stiffness, no neck tenderness, negative Brudzinski.  Unlikely EBV as no splenomegaly, no LUQ abdominal tenderness or pain.    COVID test Negative., Influenza test Negative., and Strep test Negative.    All historical, clinical,  and laboratory findings reviewed. Patient with constellation of symptoms most consistent with viral URI. There are no concerning features on physical exam to suggest an emergent or life threatening condition or an invasive bacterial infection, including, but not limited to the conditions noted above. No further intervention is indicated at this time. The patient is at low risk for an emergent/life threatening medical condition at this time, and I am of the belief that that it is safe to discharge the patient from the emergency department.     Patient/family instructed to follow up with PCP/Pediatrician in 1-2 days for recheck of today's complaints. Patient/family has been counseled regarding the need for follow-up as well as the indications to return to the emergency room should new, worsening, continued or worrisome developments. Discharge and follow-up instructions discussed with the patient/family who expressed understanding and willingness to comply with recommendations. Patient discharged from the emergency department in stable condition, in no acute distress.  I discussed with the patient/family the diagnosis, treatment plan, indications for return to the emergency department, and for expected follow-up. The patient/family verbalized an understanding. The patient/family is asked if there are any questions or concerns. We discuss the case, until all issues are addressed to the patient/family's satisfaction. Patient/family understands and is agreeable to the plan.     Amount and/or Complexity of Data Reviewed  Labs: ordered.  Radiology: ordered.    Risk  OTC drugs.  Prescription drug management.                                      Clinical Impression:  Final diagnoses:  [R05.9] Cough  [J06.9] Viral URI with cough (Primary)          ED Disposition Condition    Discharge Stable          ED Prescriptions       Medication Sig Dispense Start Date End Date Auth. Provider    benzonatate (TESSALON) 100 MG capsule   (Status: Discontinued) Take 1 capsule (100 mg total) by mouth 3 (three) times daily as needed. 15 capsule 2/12/2025 2/12/2025 Leyla Prasad PA-C    loratadine (CLARITIN) 10 mg tablet  (Status: Discontinued) Take 1 tablet (10 mg total) by mouth once daily. 30 tablet 2/12/2025 2/12/2025 Leyla Prasad PA-C    fluticasone propionate (FLONASE) 50 mcg/actuation nasal spray  (Status: Discontinued) 1 spray (50 mcg total) by Each Nostril route 2 (two) times daily as needed for Rhinitis. 15 g 2/12/2025 2/12/2025 Leyla Prasad PA-C    guaiFENesin 100 mg/5 ml (ROBITUSSIN) 100 mg/5 mL syrup  (Status: Discontinued) Take 5-10 mLs (100-200 mg total) by mouth every 4 (four) hours as needed for Cough. 60 mL 2/12/2025 2/12/2025 Leyla Prasad PA-C    benzonatate (TESSALON) 100 MG capsule Take 1 capsule (100 mg total) by mouth 3 (three) times daily as needed. 15 capsule 2/12/2025 2/17/2025 Leyla Prasad PA-C    fluticasone propionate (FLONASE) 50 mcg/actuation nasal spray Use 1 spray (50 mcg total) by Each Nostril route 2 (two) times daily as needed for Rhinitis. 16 g 2/12/2025 3/14/2025 Leyla Prasad PA-C    guaiFENesin 100 mg/5 ml (ROBITUSSIN) 100 mg/5 mL syrup Take 5-10 mLs (100-200 mg total) by mouth every 4 (four) hours as needed for Cough. 60 mL 2/12/2025 2/22/2025 Leyla Prasad PA-C    loratadine (CLARITIN) 10 mg tablet Take 1 tablet (10 mg total) by mouth once daily. 30 tablet 2/12/2025 3/14/2025 Leyla Prasad PA-C          Follow-up Information       Follow up With Specialties Details Why Contact Info    Princess REX Patel MD Internal Medicine, Pediatrics   [address] [place] [address]  [facility] [address]  [phone]      [facility] - Emergency Dept Emergency Medicine Go to  for new or worsening symptoms [address] [facility] [place] [id number] [phone]             Leyla Prasad PA-C  02/12/25 1917

## 2025-03-19 ENCOUNTER — OFFICE VISIT (OUTPATIENT)
Dept: PODIATRY | Facility: CLINIC | Age: 40
End: 2025-03-19
Payer: MEDICAID

## 2025-03-19 VITALS
HEART RATE: 75 BPM | DIASTOLIC BLOOD PRESSURE: 85 MMHG | WEIGHT: 234.56 LBS | SYSTOLIC BLOOD PRESSURE: 121 MMHG | BODY MASS INDEX: 39.08 KG/M2 | HEIGHT: 65 IN

## 2025-03-19 DIAGNOSIS — M76.822 INSUFFICIENCY OF LEFT POSTERIOR TIBIAL TENDON: ICD-10-CM

## 2025-03-19 DIAGNOSIS — M79.672 PAIN OF LEFT HEEL: ICD-10-CM

## 2025-03-19 DIAGNOSIS — M72.2 PLANTAR FASCIITIS: Primary | ICD-10-CM

## 2025-03-19 PROCEDURE — 99999 PR PBB SHADOW E&M-EST. PATIENT-LVL III: CPT | Mod: PBBFAC,,, | Performed by: PODIATRIST

## 2025-03-19 PROCEDURE — 3079F DIAST BP 80-89 MM HG: CPT | Mod: CPTII,,, | Performed by: PODIATRIST

## 2025-03-19 PROCEDURE — 3074F SYST BP LT 130 MM HG: CPT | Mod: CPTII,,, | Performed by: PODIATRIST

## 2025-03-19 PROCEDURE — 3008F BODY MASS INDEX DOCD: CPT | Mod: CPTII,,, | Performed by: PODIATRIST

## 2025-03-19 PROCEDURE — 99213 OFFICE O/P EST LOW 20 MIN: CPT | Mod: PBBFAC,PN | Performed by: PODIATRIST

## 2025-03-19 PROCEDURE — 99213 OFFICE O/P EST LOW 20 MIN: CPT | Mod: S$PBB,,, | Performed by: PODIATRIST

## 2025-03-19 PROCEDURE — 1159F MED LIST DOCD IN RCRD: CPT | Mod: CPTII,,, | Performed by: PODIATRIST

## 2025-03-19 RX ORDER — MELOXICAM 15 MG/1
15 TABLET ORAL DAILY
Qty: 30 TABLET | Refills: 1 | Status: SHIPPED | OUTPATIENT
Start: 2025-03-19

## 2025-03-19 RX ORDER — NAPROXEN 500 MG/1
500 TABLET ORAL EVERY 12 HOURS
COMMUNITY
Start: 2025-03-09 | End: 2025-03-19 | Stop reason: ALTCHOICE

## 2025-03-19 RX ORDER — DICLOFENAC SODIUM 10 MG/G
2 GEL TOPICAL 4 TIMES DAILY
Qty: 350 G | Refills: 2 | Status: SHIPPED | OUTPATIENT
Start: 2025-03-19

## 2025-03-19 NOTE — PROGRESS NOTES
Subjective:      Patient ID: Talia Colmenares is a 39 y.o. female.    Chief Complaint: Follow-up and Plantar Fasciitis (Right and left)    Patient presents for one-month F/U foot pain plantar L foot.  Has pain level up to 6 or 7 mid day that is sharp w/ occasional cramping.  Using shoes with PPT arch pads but does not have in the current shoe today; using gel strap as well in slippers.  Was to discuss possible topical versus oral anti-inflammatory as well.  25  Talia is a 39 y.o. female who presents new to the podiatry clinic stating wants full evaluation of foot. She c/o B/L foot pain/ ankle pain that goes up leg. Pain level can be up to 9/10 @ its worst. Did have an ankle injury L earlier in  for which she went to ED. Relates had a sudden sharp pain L arch & extended proximal. Was put in an airsplint for the rest of the school yr.& pain subsided. Now has pain that comes & goes - recurred 2 days ago.  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 Now works @ [employer] & uses primarily tennis shoes (Saucony today). In slides @ home.    Lives in [facility].     PCP Princess Jorge MD - Michelle Carroll    Past Medical History:   Diagnosis Date    GERD (gastroesophageal reflux disease)     Heart murmur     History of bronchitis     as a child     History of pre-eclampsia in prior pregnancy, currently pregnant     history with previous two     Hypertension     high bp off and on with this pregnancy    Rectal bleeding      Patient Active Problem List   Diagnosis    GERD (gastroesophageal reflux disease)    History of pre-eclampsia in prior pregnancy, currently pregnant    H/O  section    Trichomonas - treated with negative OLGA    Dehydration    Supervision of other normal pregnancy    Abdominal pain in pregnancy    Pregnancy with one fetus    wants btl    Tonsillolith    Obesity (BMI 35.0-39.9 without comorbidity)      Objective:      X-Ray Foot Complete Left  Order: [id number]  Status: Final  result       Visible to patient: Yes (seen)       Next appt: 03/19/2025 at 08:30 AM in Podiatry (Rebecca Metzger DPM)       Dx: Heel pain    0 Result Notes  Details    Reading Physician Reading Date Result Priority   Nik Gray MD  [phone] 3/26/2024      Narrative & Impression  EXAMINATION:  XR ANKLE COMPLETE 3 VIEW LEFT; XR FOOT COMPLETE 3 VIEW LEFT     CLINICAL HISTORY:  Effusion, left ankle; Pain in unspecified foot     TECHNIQUE:  Three views of the left foot and ankle were performed.     COMPARISON:  None     FINDINGS:  No evidence of a displaced fracture or focal osseous destructive process.     No evidence of dislocation.     No advanced degenerative change.     Moderate soft tissue swelling over the medial ankle     Impression:     Moderate soft tissue swelling over the medial ankle, without evidence of a displaced fracture or dislocation.        Electronically signed by:Nik Gray MD  Date:                                            03/26/2024  Time:                                           13:29   X-Ray Ankle Complete Left  Order: [id number]  Status: Final result       Visible to patient: Yes (seen)       Next appt: 03/19/2025 at 08:30 AM in Podiatry (Rebecca Metzger DPM)       Dx: Ankle swelling, left    0 Result Notes  Details    Reading Physician Reading Date Result Priority   Nik Gray MD  [phone] 3/26/2024 STAT     Narrative & Impression  EXAMINATION:  XR ANKLE COMPLETE 3 VIEW LEFT; XR FOOT COMPLETE 3 VIEW LEFT     CLINICAL HISTORY:  Effusion, left ankle; Pain in unspecified foot     TECHNIQUE:  Three views of the left foot and ankle were performed.     COMPARISON:  None     FINDINGS:  No evidence of a displaced fracture or focal osseous destructive process.     No evidence of dislocation.     No advanced degenerative change.     Moderate soft tissue swelling over the medial ankle     Impression:     Moderate soft tissue swelling over the medial ankle, without evidence of a  displaced fracture or dislocation.        Electronically signed by:Nik Gray MD  Date:                                            03/26/2024  Time:                                           13:29   I independently reviewed the Xray images.    Review of Systems   Constitutional: Negative for malaise/fatigue.   Cardiovascular:  Positive for leg swelling. Negative for claudication.   Skin:  Negative for color change and suspicious lesions.   Musculoskeletal:  Negative for falls, joint pain, muscle weakness and myalgias.   Neurological:  Negative for focal weakness, loss of balance, sensory change and weakness.   Psychiatric/Behavioral:  The patient is not nervous/anxious.      Physical Exam  Vitals reviewed.   Constitutional:       General: She is not in acute distress.     Appearance: She is well-developed. She is morbidly obese.   Cardiovascular:      Pulses: Normal pulses.           Dorsalis pedis pulses are 2+ on the right side and 2+ on the left side.   Musculoskeletal:         General: Tenderness and signs of injury (patient states spontaneous onset of pain but no specific injuryudde) present. No swelling.      Right lower leg: No edema.      Left lower leg: No edema.      Left foot: Deformity (pes planus B/L) present.   Feet:      Comments: Equinus B/L ankles w/ < 90 deg foot to leg noted w/ knees extended.      MS strength of extrinsics to foot & ankle B/L + 5/5 in DF/PF/Inv/Ev to resistance w/ no reproduction of pain in any direction.   TTP plantar arch/ fascia insertion into calcaneus L. No radiating pain nor TTP along PTT.    Passive ROM of ankle & pedal joints is painless & w/out crepitation B/L.   Skin:     General: Skin is warm and dry.      Capillary Refill: Capillary refill takes less than 2 seconds.      Findings: No bruising, erythema or lesion.   Neurological:      Mental Status: She is alert and oriented to person, place, and time.      Motor: Motor function is intact. No weakness or abnormal  muscle tone.      Gait: Gait is intact. Gait normal.   Psychiatric:         Mood and Affect: Mood and affect normal.         Behavior: Behavior normal. Behavior is cooperative.         Assessment:      Encounter Diagnosis   Name Primary?    Plantar fasciitis Yes         Problem List Items Addressed This Visit    None  Visit Diagnoses         Plantar fasciitis    -  Primary    Relevant Medications    diclofenac sodium (VOLTAREN) 1 % Gel    meloxicam (MOBIC) 15 MG tablet          Plan:       Talia was seen today for follow-up and plantar fasciitis.    Diagnoses and all orders for this visit:    Plantar fasciitis  -     diclofenac sodium (VOLTAREN) 1 % Gel; Apply 2 g topically 4 (four) times daily.  -     meloxicam (MOBIC) 15 MG tablet; Take 1 tablet (15 mg total) by mouth once daily.    I counseled the patient on her conditions, their implications & medical mgmt.    I reviewed imaging, clinical history, & diagnosis as above & d/w the patient. I attempted to use layman's terms to educate the patient.     -Discussed again wearing shoes w/ adequate arch supports to alleviate abnormal pressure & improve stability of foot while walking.   Avoid flat shoes or barefoot walking as these will exacerbate or worsen symptoms.  .   -Discussed non-prescription inserts vs MLA supports all times WB including @ home..  Continue PPT arch pads added to shoe gear/ inserts; added another PPT arch pad to current shoe gear.  Continue Silopos gelstrap for MLA other shoe gear.   -Rx/ OTC topical NSAID Voltaren gel up to qid prn vs PO NSAID.  Start with Voltaren gel & Mobic 15 mg q.d. (has been using q.a.m. naproxen for back).    F/u 3-4 wks., sooner prn.        A total of 25 mins.was spent on chart review, patient visit & documentation.

## 2025-03-19 NOTE — LETTER
March 19, 2025      [facility] - Podiatry  [address]  [place] [address]  Phone: [phone]  Fax: [phone]       Patient: Talia Colmenares   YOB: 1985  Date of Visit: 03/19/2025    To Whom It May Concern:    Marito Colmenares  was at [facility] on 03/19/2025. The patient may return to work/school on 3/20/2025 with no restrictions. If you have any questions or concerns, or if I can be of further assistance, please do not hesitate to contact me.    Sincerely,    Annamaria Mcclelland MA

## 2025-05-20 ENCOUNTER — HOSPITAL ENCOUNTER (EMERGENCY)
Facility: HOSPITAL | Age: 40
Discharge: HOME | End: 2025-05-21
Attending: EMERGENCY MEDICINE
Payer: MEDICAID

## 2025-05-20 DIAGNOSIS — R07.9 CHEST PAIN: ICD-10-CM

## 2025-05-20 DIAGNOSIS — R05.9 COUGH: ICD-10-CM

## 2025-05-20 DIAGNOSIS — M25.571 ACUTE RIGHT ANKLE PAIN: Primary | ICD-10-CM

## 2025-05-20 DIAGNOSIS — S99.911A ANKLE INJURIES, RIGHT, INITIAL ENCOUNTER: ICD-10-CM

## 2025-05-20 DIAGNOSIS — J34.89 STUFFY AND RUNNY NOSE: ICD-10-CM

## 2025-05-20 LAB
BUN SERPL-MCNC: 14 MG/DL (ref 6–30)
CHLORIDE SERPL-SCNC: 103 MMOL/L (ref 95–110)
CREAT SERPL-MCNC: 0.8 MG/DL (ref 0.5–1.4)
GLUCOSE SERPL-MCNC: 122 MG/DL (ref 70–110)
HCT VFR BLD CALC: 35 %PCV (ref 36–54)
POC IONIZED CALCIUM: 1.21 MMOL/L (ref 1.06–1.42)
POC TCO2 (MEASURED): 26 MMOL/L (ref 23–29)
POTASSIUM BLD-SCNC: 3.8 MMOL/L (ref 3.5–5.1)
SAMPLE: ABNORMAL
SODIUM BLD-SCNC: 142 MMOL/L (ref 136–145)

## 2025-05-20 PROCEDURE — 93010 ELECTROCARDIOGRAM REPORT: CPT | Mod: ,,, | Performed by: INTERNAL MEDICINE

## 2025-05-20 PROCEDURE — 93005 ELECTROCARDIOGRAM TRACING: CPT

## 2025-05-20 PROCEDURE — 99284 EMERGENCY DEPT VISIT MOD MDM: CPT | Mod: 25

## 2025-05-20 PROCEDURE — 96374 THER/PROPH/DIAG INJ IV PUSH: CPT

## 2025-05-20 PROCEDURE — 80047 BASIC METABLC PNL IONIZED CA: CPT

## 2025-05-20 PROCEDURE — 25000003 PHARM REV CODE 250: Performed by: PHYSICIAN ASSISTANT

## 2025-05-20 RX ORDER — HYDROCODONE BITARTRATE AND ACETAMINOPHEN 10; 325 MG/1; MG/1
1 TABLET ORAL
Refills: 0 | Status: COMPLETED | OUTPATIENT
Start: 2025-05-20 | End: 2025-05-20

## 2025-05-20 RX ORDER — GUAIFENESIN 100 MG/5ML
200 LIQUID ORAL ONCE
Status: COMPLETED | OUTPATIENT
Start: 2025-05-20 | End: 2025-05-21

## 2025-05-20 RX ORDER — KETOROLAC TROMETHAMINE 10 MG/1
10 TABLET, FILM COATED ORAL EVERY 6 HOURS
Qty: 20 TABLET | Refills: 0 | Status: SHIPPED | OUTPATIENT
Start: 2025-05-20 | End: 2025-05-25

## 2025-05-20 RX ORDER — METHOCARBAMOL 500 MG/1
1000 TABLET, FILM COATED ORAL 3 TIMES DAILY PRN
Qty: 30 TABLET | Refills: 0 | Status: SHIPPED | OUTPATIENT
Start: 2025-05-20 | End: 2025-05-25

## 2025-05-20 RX ORDER — GUAIFENESIN AND DEXTROMETHORPHAN HYDROBROMIDE 10; 100 MG/5ML; MG/5ML
5 SYRUP ORAL 4 TIMES DAILY PRN
Qty: 120 ML | Refills: 0 | Status: SHIPPED | OUTPATIENT
Start: 2025-05-20 | End: 2025-05-30

## 2025-05-20 RX ORDER — KETOROLAC TROMETHAMINE 30 MG/ML
30 INJECTION, SOLUTION INTRAMUSCULAR; INTRAVENOUS
Status: DISCONTINUED | OUTPATIENT
Start: 2025-05-20 | End: 2025-05-20

## 2025-05-20 RX ORDER — BENZONATATE 100 MG/1
100 CAPSULE ORAL 3 TIMES DAILY PRN
Qty: 20 CAPSULE | Refills: 0 | Status: SHIPPED | OUTPATIENT
Start: 2025-05-20 | End: 2025-05-30

## 2025-05-20 RX ORDER — KETOROLAC TROMETHAMINE 30 MG/ML
15 INJECTION, SOLUTION INTRAMUSCULAR; INTRAVENOUS
Status: COMPLETED | OUTPATIENT
Start: 2025-05-21 | End: 2025-05-20

## 2025-05-20 RX ORDER — OXYMETAZOLINE HCL 0.05 %
1 SPRAY, NON-AEROSOL (ML) NASAL 2 TIMES DAILY
Qty: 15 ML | Refills: 0 | Status: SHIPPED | OUTPATIENT
Start: 2025-05-20 | End: 2025-05-23

## 2025-05-20 RX ADMIN — HYDROCODONE BITARTRATE AND ACETAMINOPHEN 1 TABLET: 10; 325 TABLET ORAL at 11:05

## 2025-05-20 RX ADMIN — KETOROLAC TROMETHAMINE 15 MG: 30 INJECTION, SOLUTION INTRAMUSCULAR; INTRAVENOUS at 11:05

## 2025-05-21 VITALS
DIASTOLIC BLOOD PRESSURE: 68 MMHG | WEIGHT: 215 LBS | TEMPERATURE: 98 F | BODY MASS INDEX: 36.33 KG/M2 | HEART RATE: 92 BPM | SYSTOLIC BLOOD PRESSURE: 122 MMHG | OXYGEN SATURATION: 100 % | RESPIRATION RATE: 18 BRPM

## 2025-05-21 LAB
OHS QRS DURATION: 66 MS
OHS QTC CALCULATION: 461 MS

## 2025-05-21 PROCEDURE — 63600175 PHARM REV CODE 636 W HCPCS: Mod: JZ,TB | Performed by: PHYSICIAN ASSISTANT

## 2025-05-21 PROCEDURE — 25000003 PHARM REV CODE 250: Performed by: PHYSICIAN ASSISTANT

## 2025-05-21 RX ADMIN — GUAIFENESIN 200 MG: 200 SOLUTION ORAL at 12:05

## (undated) DEVICE — BLADE 4IN EDGE INSULATED

## (undated) DEVICE — Device

## (undated) DEVICE — TIP YANKAUERS BULB NO VENT

## (undated) DEVICE — GLOVE BIOGEL ECLIPSE SZ 7.5

## (undated) DEVICE — TUBING SUC UNIV W/CONN 12FT

## (undated) DEVICE — PENCIL ELECTROSURG HOLST W/BLD

## (undated) DEVICE — ELECTRODE REM PLYHSV RETURN 9

## (undated) DEVICE — GOWN ECLIPSE REINF LVL4 TWL LG

## (undated) DEVICE — DRAPE STERI-DRAPE 1000 17X11IN

## (undated) DEVICE — ELECTRODE BLD EXT INSUL 1

## (undated) DEVICE — CATH ALL PUR URTHL RR 10FR